# Patient Record
Sex: FEMALE | Race: WHITE
[De-identification: names, ages, dates, MRNs, and addresses within clinical notes are randomized per-mention and may not be internally consistent; named-entity substitution may affect disease eponyms.]

---

## 2017-03-26 NOTE — NUR
Pt given script for flexiril po and motrin po , pt refused and asked for norco 
10/325 mg po . pt informed the er dr would not be writting for that medicine. 
Pt ambulatory w/ steady gait to waiting room , where pt seen bending over 
picking up stuff from the floor w/ no difficulty.

## 2017-03-27 ENCOUNTER — HOSPITAL ENCOUNTER (EMERGENCY)
Dept: HOSPITAL 10 - FTE | Age: 37
Discharge: HOME | End: 2017-03-27
Payer: MEDICARE

## 2017-03-27 VITALS
HEART RATE: 84 BPM | SYSTOLIC BLOOD PRESSURE: 121 MMHG | TEMPERATURE: 98.2 F | RESPIRATION RATE: 18 BRPM | DIASTOLIC BLOOD PRESSURE: 88 MMHG

## 2017-03-27 VITALS — WEIGHT: 152.12 LBS | HEIGHT: 55 IN | BODY MASS INDEX: 35.2 KG/M2

## 2017-03-27 DIAGNOSIS — M54.5: Primary | ICD-10-CM

## 2017-03-27 PROCEDURE — 72100 X-RAY EXAM L-S SPINE 2/3 VWS: CPT

## 2017-03-27 NOTE — RADRPT
PROCEDURE:   Lumbar spine series 

 

CLINICAL INDICATION:   Back pain

 

TECHNIQUE:   Three views of the lumbar spine are available for review 

 

COMPARISON:   None available 

 

FINDINGS:

 

The normal lumbar lordosis is preserved.  There is subtle levoscoliosis of the lumbar spine. Alignme
nt is intact. No acute fracture or dislocation is seen. Vertebral body heights are well maintained. 
 Intervertebral disk heights are well maintained.  Multiple clips are noted in the left upper quadra
nt.  There is a small rounded appearing catheter likely related to prior lap band..  

 

IMPRESSION:

 

1.  Subtle levoscoliosis of the lumbar spine.

2.  Otherwise unremarkable lumbar spine series. 

 

RPTAT: KK

_____________________________________________ 

.Cruz Higginbotham MD, MD           Date    Time 

Electronically viewed and signed by .Cruz Higginbotham MD, MD on 03/27/2017 12:29 

 

D:  03/27/2017 12:29  T:  03/27/2017 12:29

.B/

## 2017-03-27 NOTE — ERD
ER Documentation


Chief Complaint


Date/Time


DATE: 3/27/17 


TIME: 10:55


Chief Complaint


non traumatic low backpain for 3 days. seen for same in other er's today





HPI


36-year-old female complaining of right lower back pain 3 days.  Patient 

stated the pain onset 3 days ago while she woke up.  She was at [facility] on 5150 hold at that time.  She was discharged yesterday.  Since then 

she has been to 4 other EDs for her back pain.  This is her third ED visit 

today.  Patient stated that the pain initiated at her right buttock, and 

radiates down her right foot.  She had received Norco, Flexeril, tramadol, 

morphine injections, Toradol injections, steroid injections from her previous 

ED visits in the last 2 days.  Morphine did help her pain, but only briefly.  

No other treatment helped.  Denies injury.  Denies fever or chills.  Denies 

saddle paresthesia.





ROS


All systems reviewed and are negative except as per history of present illness.





Medications


Home Meds


Active Scripts


Ibuprofen* (Motrin*) 600 Mg Tab, 600 MG PO Q6H Y for PAIN AND OR ELEVATED TEMP, 

#30 TAB


   Prov:VAISHALI GAMA NP         3/27/17


Reported Medications


Hydromorphone Hcl (Dilaudid) 2 Mg Tab


   8/17/10


Topiramate* (Topamax*) 100 Mg Tablet


   8/17/10


Duloxetine Hcl* (Cymbalta*) 60 Mg Capsule.


   8/17/10


[None]   No Conflict Check


   5/12/10





Allergies


Allergies:  


Coded Allergies:  


     No Known Allergies (Verified  Allergy, Mild, 8/18/10)





PMhx/Soc


History of Surgery:  Yes (gastric bypass)


Anesthesia Reaction:  No


Hx Neurological Disorder:  No


Hx Respiratory Disorders:  No


Hx Cardiac Disorders:  Yes (cardiac myopathy)


Hx Psychiatric Problems:  No


Hx Miscellaneous Medical Probl:  No


Hx Alcohol Use:  No


Hx Substance Use:  No


Hx Tobacco Use:  No





Physical Exam


Vitals





Vital Signs








  Date Time  Temp Pulse Resp B/P Pulse Ox O2 Delivery O2 Flow Rate FiO2


 


3/27/17 09:31 98.5 100 21 141/81 98   








Physical Exam


General impression:  Well-developed, well-nourished.  Alert, oriented, in no 

acute distress


Head:    Normocephalic, atraumatic.


Neck:    Supple, nontender. No lymphadenopathy. No nuchal rigidity.


Respiration:    Normal respiratory effort. Lungs clear to auscultate 

bilaterally. No wheezes, rales or rhonchi.


Cardiovascular: Regular rate and rhythm. No murmurs or extra heart sounds.


Abdomen:    Abdomen normal to inspection. Nontender. No masses or organomegaly. 

Bowel sounds normal.


Back:   Normal to inspection. Midline spine tenderness at L5 and S1.  Right 

buttock tenderness.  No CVA tenderness.  Questionable straight leg raise on the 

right.


Extremities:    Extremities normal to inspection, nontender. ROM normal.  

Neurovascularly intact.  No saddle paresthesia.


Neuro:    Mental status normal, speech normal. CNS grossly intact. 


Skin:    Normal turgor. No rash or lesions.


Psych:    Patient appears to be extremely anxious.


Results 24 hrs





 Current Medications








 Medications


  (Trade)  Dose


 Ordered  Sig/Ethan


 Route


 PRN Reason  Start Time


 Stop Time Status Last Admin


Dose Admin


 


 Bupivacaine HCl


  (Marcaine 0.25%


  (Mpf) 10 ml)  10 ml  ONCE  ONCE


 INJ


   3/27/17 11:00


 3/27/17 11:01 DC  


 


 


 Lorazepam


  (Ativan)  1 mg  ONCE  ONCE


 PO


   3/27/17 13:00


 3/27/17 13:01 DC 3/27/17 12:57


 





PROCEDURE:   Lumbar spine series 


 


CLINICAL INDICATION:   Back pain


 


TECHNIQUE:   Three views of the lumbar spine are available for review 


 


COMPARISON:   None available 


 


FINDINGS:


 


The normal lumbar lordosis is preserved.  There is subtle levoscoliosis of the 

lumbar spine. Alignment is intact. No acute fracture or dislocation is seen. 

Vertebral body heights are well maintained.  Intervertebral disk heights are 

well maintained.  Multiple clips are noted in the left upper quadrant.  There 

is a small rounded appearing catheter likely related to prior lap band..  


 


IMPRESSION:


 


1.  Subtle levoscoliosis of the lumbar spine.


2.  Otherwise unremarkable lumbar spine series. 


 


RPTAT: KK


_____________________________________________ 


.Cruz Higginbotham MD, MD           Date    Time 


Electronically viewed and signed by .Cruz Higginbotham MD, MD on 03/27/ 2017 12:29 


 


D:  03/27/2017 12:29  T:  03/27/2017 12:29


.B/





CC: VAISHALI GAMA NP





Procedures/MDM


36-year-old female presented ED was right buttock pain that radiates down her 

leg.  Patient is noted to have midline tenderness in the L5-S1 levels.  X-ray 

of lumbar spine was obtained.  X-ray is negative for spinal fracture or 

subluxation.  I have low suspicion for spinal epidural abscess, or cauda equina 

syndrome.  This is patient's fifth visit to ED in the last 2 days, third visit 

today.  All 5 visits are to different EDs. Patient is observed to be resting 

comfortably.  However when she is talking to a provider or a nurse, she cries 

and states the pain is unbearable.  She requests narcotic prescription.  Review 

of cures database shows that patient receives Suboxone monthly, 90 tablets in 

February.  I told patient that I would not be prescribe her with any narcotics, 

I will give her a trigger point injection to help ease her pain.





Procedure note: Trigger point injection


Trigger point injection performed by me. 10 mL of bupivacaine is injected into 

right gluteal region. Total number muscle groups injected: 1. 





Patient also states that she was released from 5150 hold at [facility] 2 days ago, she is currently homeless.  She feels extremely anxious, 

wants Ativan.  Cures database also show the patient receives Ativan 

prescriptions monthly, last time the prescription filled was on 3/14/2017.  I 

told her that I will give her 1 tablet of Ativan here in the ED, but will not 

give her any prescriptions.  She refused  consult.  There is also 

no record of her admitted at [facility] within this month based 

on the [facility] data.





Patient's behavior is highly suspicious for drug-seeking.  Patient appears well

, stable for discharge and outpatient management.  Patient is advised to follow-

up with her PCP and her pain management provider.  Medical decision making 

shared with patient and family. Education provided to patient and family. 

Patient and family expressed understanding of the plan.





Medications on discharge: Ibuprofen.


Follow-up: Primary care provider or pain management.











VAISHALI GAMA NP Mar 27, 2017 11:06

## 2017-04-10 NOTE — NUR
PT LEFT ROOM, AMBULATING TO THE , TO THE NURSING STATION, YELLING AND 
SCREAMING TO STAFF, HAVING LOUD OUT BURST, CONSTANT RE-DIRECTION NEEDED. 



CHARGE NURSE IS AWARE. MD LAWRENCE IS AWARE.  [LEFT MESSAGE], 
SECURITY IS AWARE



PT HAVE SCATTERED THOUGHT PROCESS, HIGHLY NEEDY, HIGHLY DEMANDING



MD LAWRENCE AT BEDSIDE CONSTANTLY RE-DIRECTING PT ALONG WITH NURSES HOWEVER, PT 
REMAINS NON-COMPLAINT AT THIS TIME. 



WCTM PT AT THIS TIME.

## 2017-04-10 NOTE — NUR
PT IS NOW IN BED, REMAINS RESTLESS, LESS ANXIOUS, NO LONGER SCREAMING AT THIS 
TIME. CONSTANT RE-DIRECTION/EMOTIONAL SUPPORT NEEDED. WCTM PT AT THIS TIME. 
WAITING FOR FURTHER PLAN OF CARE

## 2017-04-10 NOTE — NUR
KATHLEEN called by SAM Beltrán to meet with patient.  KATHLEEN arrived to the ED and consulted with SAM Beltrán and Dr. Rebolledo.  10:20am, SW met with patient, who was sitting on the edge of the bed 
and had her friend Swapnil in the room with her.  Patient was receptive to meeting with SW.  
SW asked patient if she was fine with having Swapnil in the room during the meeting, and 
patient stated "yes he can stay".  SW completed a psychosocial assessment.  Patient is a 35 y/o female; she reported becoming recently homeless after not being able to afford her 
previous place of residence.  Patient used to work in customer service, but became 
unemployed about 1 year ago.  Patient stated that she had some money to live off of for a 
while, but recently became unable to afford her place of residence.  Patient stated that she 
and her friend Swapnil were living in a homeless camp, but were now looking for alternative 
housing.  At the time of this interview with the SW, patient was alert, oriented, maintained 
appropriate eye contact, and was cooperative.  Patient's tone of voice and speech were 
within normal limits and pattern.  Patient with history of mental illness, reporting 
suffering from depression and anxiety from age 13. Patient reported being on Paxil, and 
being followed by a psychiatrist.  Patient reported not receiving counseling services and 
was open to resources for counseling. Patient also reported history of drug use, mostly 
marijuana as recently as yesterday.  Patient stated that last night and this morning, she 
felt very anxious and overwhelmed and was having a hard time breathing, which resulted in 
her being brought into the ED by paramedics (see MD notes for additional details).  Patient 
assessed for SI, which she reported she had had one attempt of an overdose a couple of years 
ago.  No current SI/HI.  Patient reported her only income is SSDI.  SW explored patient's 
support system, and patient reported that she does not have any family; patient reported 
that her closest support system is her friend Swapnil.  SW explored the need for community 
resources, and the patient agreed to the following resources:

1) [employer]:  a list of food pantries throughout the [facility] provided to the 
patient

2) A list of locations and daily schedule for showers and hot meals in the [facility] provided to patient

3) [facility] [place] [address] [place] [phone]

4) A list of cheap hotels in [place] provided to patient

5) Outpatient mental health agencies:  [facility] 
[phone] and [facility] [phone]

6) [address] [place] [address] [facility]



Patient expressed being content with these resources and thanked the SW.  Patient also 
stated that she and Swapnil were looking for apartments that they can afford between her 
SSDI and his paycheck, and had already connected with one landlord to go look at a vacant 
apartment. She reported not needing additional resources for housing then the ones that SW 
provided.  KATHLEEN informed SAM Beltrán and Dr. Rebolledo that resources were provided.

Copies of these resources were placed in the patient's ED file.

## 2017-04-10 NOTE — NUR
PT AT BEDSIDE, SPEAKING WITH "NORMAL" TONE/PACE, WITH VISITOR SOPHIA, NO 
LABOURED BREATHING, SPEAKING IN FULL SENTENCES. NO SOB NOTED AT THIS TIME. PT 
IS IN NO DISTRESS, POSTURE SUDDENLY CHANGED. PT IS SITTING UPRIGHT, NO TEARS, 
NO LOUD OUTBURST, NOT ANXIOUS, NOT RESTLESS, PT IS CALM AND COLLECTED.

## 2017-04-10 NOTE — NUR
PT IS AT BEDSIDE, HIGHLY ANXIOUS, RESTLESS, LOUD OUTBURST, AGITATED, HIGHLY 
NEEDY. PER REPORT FROM RA, PT TOOK "MARIJUANA AND OTHER DRUG SUBSTANCE" CAUSING 
PT TO BE HIGHLY PARANOID, FEARFUL, TEARFUL. PT NEEDS CONSTANT RE-DIRECTION 
NEEDED. NON-COMPLIANT WITH NURSING STAFF. SCREAMING AND YELLING IN THE HALLWAY. 
DENIES SI/HI/AVH AT THIS TIME. MD LAWRENCE IS AWARE. MEDICATION ADMINISTERED AS 
ORDERED. WCTM PT AT THIS TIME. WAITING FOR FURTHER PLAN OF CARE

## 2017-04-10 NOTE — NUR
Patient discharged to home in stable conditon.  Written and verbal after care 
instructions given. 

Patient verbalizes understanding of instructions. No further questions or 
concerns noted prior on leaving the ED. Available resources given to pt per 
Social Work instructions.

## 2017-04-10 NOTE — NUR
PT IS EATING WITH GOOD APPETITE. NAD NOTED. BREATHING IS EVEN AND UNLABORED, NO 
LOUD OUTBURST, NO COMPLAINTS, AMBULATORY WITH STEADY GAIT. 





AT THIS TIME, PT APPEARS TO HAVE "MED SEEKING BEHAVIOR", WILL CRY OUT LOUD WHEN 
NEEDS ARE NOT MET, THROWS SUDDEN TANTRUMS UNTIL NEEDS ARE MET. CONSTANT 
RE-DIRECTION NEEDED. HIGHLY ATTENTION SEEKING. 



WCTM PT AT THIS TIME.

## 2017-04-10 NOTE — NUR
ORDERED BREAKFAST TRAY FOR PT FOR COMFORT. PT HAVE A LONG HX OF SUBSTANCE 
ABUSE. AFTER MEDICATION WAS ADMINISTERED, PT BECAME CALM FOR A SHORT PERIOD OF 
TIME. THEN PT RETURNED TO ORIGINAL STATE OF HAVING LOUD OUTBURST, UNCOOPERATIVE 
BEHAVIOR, CONSTANT RE-DRECTION. PT IS REFUSING LAB DRAWS, EXPLAINED IMPORTANCE 
OF LAB DRAWS, PT REFUSED. MD LAWRENCE MADE AWARE. WCTM PT AT THIS TIME. WAITING 
FOR FURTHER PLAN OF CARE

## 2017-07-07 ENCOUNTER — HOSPITAL ENCOUNTER (EMERGENCY)
Dept: HOSPITAL 72 - EMR | Age: 37
Discharge: HOME | End: 2017-07-07
Payer: COMMERCIAL

## 2017-07-07 VITALS — SYSTOLIC BLOOD PRESSURE: 113 MMHG | DIASTOLIC BLOOD PRESSURE: 78 MMHG

## 2017-07-07 VITALS — WEIGHT: 140 LBS | HEIGHT: 64 IN | BODY MASS INDEX: 23.9 KG/M2

## 2017-07-07 DIAGNOSIS — F41.9: Primary | ICD-10-CM

## 2017-07-07 PROCEDURE — 99283 EMERGENCY DEPT VISIT LOW MDM: CPT

## 2017-07-09 ENCOUNTER — HOSPITAL ENCOUNTER (EMERGENCY)
Dept: HOSPITAL 72 - EMR | Age: 37
Discharge: HOME | End: 2017-07-09
Payer: COMMERCIAL

## 2017-07-09 VITALS — WEIGHT: 140 LBS | BODY MASS INDEX: 23.9 KG/M2 | HEIGHT: 64 IN

## 2017-07-09 VITALS — DIASTOLIC BLOOD PRESSURE: 71 MMHG | SYSTOLIC BLOOD PRESSURE: 128 MMHG

## 2017-07-09 VITALS — SYSTOLIC BLOOD PRESSURE: 128 MMHG | DIASTOLIC BLOOD PRESSURE: 71 MMHG

## 2017-07-09 DIAGNOSIS — F41.9: Primary | ICD-10-CM

## 2017-07-09 LAB
ALBUMIN/GLOB SERPL: 1.4 {RATIO} (ref 1–2.7)
ALT SERPL-CCNC: 12 U/L (ref 3–33)
ANION GAP SERPL CALC-SCNC: 18 MMOL/L (ref 5–15)
APAP SERPL-MCNC: < 10 UG/ML (ref 10–30)
AST SERPL-CCNC: 25 U/L (ref 5–40)
BASOPHILS NFR BLD AUTO: 1.2 % (ref 0–2)
CALCIUM SERPL-MCNC: 9.2 MG/DL (ref 8.6–10.2)
CHLORIDE SERPL-SCNC: 103 MEQ/L (ref 98–107)
CO2 SERPL-SCNC: 17 MEQ/L (ref 20–30)
CREAT SERPL-MCNC: 0.8 MG/DL (ref 0.5–0.9)
EOSINOPHIL NFR BLD AUTO: 0.9 % (ref 0–3)
ERYTHROCYTE [DISTWIDTH] IN BLOOD BY AUTOMATED COUNT: 16.9 % (ref 11.6–14.8)
ETHANOL SERPL-MCNC: < 10 MG/DL
GFR SERPLBLD BASED ON 1.73 SQ M-ARVRAT: > 60 ML/MIN (ref 60–?)
GLOBULIN SER-MCNC: 3 G/DL
HEMOLYSIS: 69
LYMPHOCYTES NFR BLD AUTO: 23 % (ref 20–45)
MCH RBC QN AUTO: 24 PG (ref 27–31)
MCHC RBC AUTO-ENTMCNC: 32.2 G/DL (ref 32–36)
MCV RBC AUTO: 75 FL (ref 80–99)
MONOCYTES NFR BLD AUTO: 5.1 % (ref 1–10)
NEUTROPHILS NFR BLD AUTO: 69.8 % (ref 45–75)
PLATELET # BLD: 369 K/UL (ref 150–450)
PMV BLD AUTO: 8.3 FL (ref 6.5–10.1)
POTASSIUM SERPL-SCNC: 4.8 MEQ/L (ref 3.4–4.9)
PROT SERPL-MCNC: 7.3 G/DL (ref 6.6–8.7)
RBC # BLD AUTO: 4.45 M/UL (ref 4.2–5.4)
SODIUM SERPL-SCNC: 138 MEQ/L (ref 135–145)
WBC # BLD AUTO: 6.8 K/UL (ref 4.8–10.8)

## 2017-07-09 PROCEDURE — 80300: CPT

## 2017-07-09 PROCEDURE — 85025 COMPLETE CBC W/AUTO DIFF WBC: CPT

## 2017-07-09 PROCEDURE — 99283 EMERGENCY DEPT VISIT LOW MDM: CPT

## 2017-07-09 PROCEDURE — 80053 COMPREHEN METABOLIC PANEL: CPT

## 2017-07-09 PROCEDURE — 36415 COLL VENOUS BLD VENIPUNCTURE: CPT

## 2017-07-09 PROCEDURE — 80329 ANALGESICS NON-OPIOID 1 OR 2: CPT

## 2017-07-09 PROCEDURE — 96372 THER/PROPH/DIAG INJ SC/IM: CPT

## 2017-07-21 ENCOUNTER — HOSPITAL ENCOUNTER (EMERGENCY)
Dept: HOSPITAL 72 - EMR | Age: 37
Discharge: HOME | End: 2017-07-21
Payer: COMMERCIAL

## 2017-07-21 VITALS — WEIGHT: 140 LBS | BODY MASS INDEX: 23.9 KG/M2 | HEIGHT: 64 IN

## 2017-07-21 VITALS — SYSTOLIC BLOOD PRESSURE: 121 MMHG | DIASTOLIC BLOOD PRESSURE: 72 MMHG

## 2017-07-21 VITALS — DIASTOLIC BLOOD PRESSURE: 72 MMHG | SYSTOLIC BLOOD PRESSURE: 121 MMHG

## 2017-07-21 DIAGNOSIS — F41.9: Primary | ICD-10-CM

## 2017-07-21 PROCEDURE — 99282 EMERGENCY DEPT VISIT SF MDM: CPT

## 2017-07-27 ENCOUNTER — HOSPITAL ENCOUNTER (EMERGENCY)
Dept: HOSPITAL 72 - EMR | Age: 37
Discharge: HOME | End: 2017-07-27
Payer: COMMERCIAL

## 2017-07-27 VITALS — WEIGHT: 150 LBS | HEIGHT: 64 IN | BODY MASS INDEX: 25.61 KG/M2

## 2017-07-27 VITALS — DIASTOLIC BLOOD PRESSURE: 74 MMHG | SYSTOLIC BLOOD PRESSURE: 113 MMHG

## 2017-07-27 VITALS — SYSTOLIC BLOOD PRESSURE: 113 MMHG | DIASTOLIC BLOOD PRESSURE: 74 MMHG

## 2017-07-27 DIAGNOSIS — F11.20: ICD-10-CM

## 2017-07-27 DIAGNOSIS — F41.9: Primary | ICD-10-CM

## 2017-07-27 DIAGNOSIS — F13.20: ICD-10-CM

## 2017-07-27 DIAGNOSIS — Z76.5: ICD-10-CM

## 2017-07-27 PROCEDURE — 99282 EMERGENCY DEPT VISIT SF MDM: CPT

## 2017-10-28 ENCOUNTER — HOSPITAL ENCOUNTER (EMERGENCY)
Dept: HOSPITAL 72 - EMR | Age: 37
Discharge: HOME | End: 2017-10-28
Payer: MEDICARE

## 2017-10-28 VITALS — DIASTOLIC BLOOD PRESSURE: 87 MMHG | SYSTOLIC BLOOD PRESSURE: 137 MMHG

## 2017-10-28 VITALS — HEIGHT: 64 IN | WEIGHT: 140 LBS | BODY MASS INDEX: 23.9 KG/M2

## 2017-10-28 VITALS — DIASTOLIC BLOOD PRESSURE: 64 MMHG | SYSTOLIC BLOOD PRESSURE: 110 MMHG

## 2017-10-28 VITALS — DIASTOLIC BLOOD PRESSURE: 98 MMHG | SYSTOLIC BLOOD PRESSURE: 117 MMHG

## 2017-10-28 DIAGNOSIS — F41.0: ICD-10-CM

## 2017-10-28 DIAGNOSIS — Z90.49: ICD-10-CM

## 2017-10-28 DIAGNOSIS — F41.9: Primary | ICD-10-CM

## 2017-10-28 DIAGNOSIS — I42.9: ICD-10-CM

## 2017-10-28 DIAGNOSIS — Z98.84: ICD-10-CM

## 2017-10-28 DIAGNOSIS — N39.0: ICD-10-CM

## 2017-10-28 LAB
ALBUMIN/GLOB SERPL: 1 {RATIO} (ref 1–2.7)
ALT SERPL-CCNC: 63 U/L (ref 12–78)
ANION GAP SERPL CALC-SCNC: 9 MMOL/L (ref 5–15)
APPEARANCE UR: (no result)
AST SERPL-CCNC: 28 U/L (ref 15–37)
BACTERIA #/AREA URNS HPF: (no result) /HPF
BASOPHILS NFR BLD AUTO: 1.5 % (ref 0–2)
CALCIUM SERPL-MCNC: 8.9 MG/DL (ref 8.5–10.1)
CHLORIDE SERPL-SCNC: 102 MMOL/L (ref 98–107)
CO2 SERPL-SCNC: 27 MMOL/L (ref 21–32)
CREAT SERPL-MCNC: 0.7 MG/DL (ref 0.55–1.3)
EOSINOPHIL NFR BLD AUTO: 3.5 % (ref 0–3)
ERYTHROCYTE [DISTWIDTH] IN BLOOD BY AUTOMATED COUNT: 15.5 % (ref 11.6–14.8)
GFR SERPLBLD BASED ON 1.73 SQ M-ARVRAT: > 60 ML/MIN (ref 60–?)
GLOBULIN SER-MCNC: 3.5 G/DL
ICTOTEST: NEGATIVE
KETONES UR QL STRIP: NEGATIVE
LEUKOCYTE ESTERASE UR QL STRIP: NEGATIVE
LIPASE SERPL-CCNC: 178 U/L (ref 73–393)
LYMPHOCYTES NFR BLD AUTO: 24.3 % (ref 20–45)
MCH RBC QN AUTO: 21.9 PG (ref 27–31)
MCHC RBC AUTO-ENTMCNC: 30.3 G/DL (ref 32–36)
MCV RBC AUTO: 72 FL (ref 80–99)
MONOCYTES NFR BLD AUTO: 7.8 % (ref 1–10)
NEUTROPHILS NFR BLD AUTO: 63 % (ref 45–75)
NITRITE UR QL STRIP: POSITIVE
PH UR STRIP: 5 [PH] (ref 4.5–8)
PLATELET # BLD: 275 K/UL (ref 150–450)
PMV BLD AUTO: 9.5 FL (ref 6.5–10.1)
POTASSIUM SERPL-SCNC: 4.2 MMOL/L (ref 3.5–5.1)
PROT SERPL-MCNC: 7.1 G/DL (ref 6.4–8.2)
PROT UR QL STRIP: (no result)
RBC # BLD AUTO: 4.17 M/UL (ref 4.2–5.4)
RBC #/AREA URNS HPF: (no result) /HPF (ref 0–2)
SODIUM SERPL-SCNC: 138 MMOL/L (ref 136–145)
SP GR UR STRIP: 1.01 (ref 1–1.03)
SQUAMOUS #/AREA URNS LPF: (no result) /LPF
UROBILINOGEN UR-MCNC: 4 MG/DL (ref 0–1)
WBC # BLD AUTO: 4.4 K/UL (ref 4.8–10.8)
WBC #/AREA URNS HPF: (no result) /HPF (ref 0–2)

## 2017-10-28 PROCEDURE — 36415 COLL VENOUS BLD VENIPUNCTURE: CPT

## 2017-10-28 PROCEDURE — 81003 URINALYSIS AUTO W/O SCOPE: CPT

## 2017-10-28 PROCEDURE — 83690 ASSAY OF LIPASE: CPT

## 2017-10-28 PROCEDURE — 80053 COMPREHEN METABOLIC PANEL: CPT

## 2017-10-28 PROCEDURE — 96361 HYDRATE IV INFUSION ADD-ON: CPT

## 2017-10-28 PROCEDURE — 87086 URINE CULTURE/COLONY COUNT: CPT

## 2017-10-28 PROCEDURE — 99284 EMERGENCY DEPT VISIT MOD MDM: CPT

## 2017-10-28 PROCEDURE — 96374 THER/PROPH/DIAG INJ IV PUSH: CPT

## 2017-10-28 PROCEDURE — 85025 COMPLETE CBC W/AUTO DIFF WBC: CPT

## 2017-10-28 PROCEDURE — 71010: CPT

## 2018-07-25 ENCOUNTER — HOSPITAL ENCOUNTER (EMERGENCY)
Dept: HOSPITAL 91 - E/R | Age: 38
Discharge: HOME | End: 2018-07-25
Payer: COMMERCIAL

## 2018-07-25 DIAGNOSIS — R11.2: ICD-10-CM

## 2018-07-25 DIAGNOSIS — Z76.5: ICD-10-CM

## 2018-07-25 DIAGNOSIS — F17.210: ICD-10-CM

## 2018-07-25 DIAGNOSIS — Z87.891: ICD-10-CM

## 2018-07-25 DIAGNOSIS — R10.84: Primary | ICD-10-CM

## 2018-07-25 DIAGNOSIS — R10.11: Primary | ICD-10-CM

## 2018-07-25 LAB
ADD MAN DIFF?: NO
ADD UMIC: NO
ALANINE AMINOTRANSFERASE: 13 IU/L (ref 13–69)
ALBUMIN/GLOBULIN RATIO: 1.32
ALBUMIN: 3.7 G/DL (ref 3.3–4.9)
ALKALINE PHOSPHATASE: 60 IU/L (ref 42–121)
ANION GAP: 12 (ref 8–16)
ASPARTATE AMINO TRANSFERASE: 32 IU/L (ref 15–46)
BASOPHIL #: 0 10^3/UL (ref 0–0.1)
BASOPHILS %: 0.6 % (ref 0–2)
BILIRUBIN,DIRECT: 0 MG/DL (ref 0–0.2)
BILIRUBIN,TOTAL: 0.2 MG/DL (ref 0.2–1.3)
BLOOD UREA NITROGEN: 14 MG/DL (ref 7–20)
CALCIUM: 8.8 MG/DL (ref 8.4–10.2)
CARBON DIOXIDE: 19 MMOL/L (ref 21–31)
CHLORIDE: 115 MMOL/L (ref 97–110)
CREATININE: 0.79 MG/DL (ref 0.44–1)
EOSINOPHILS #: 0.1 10^3/UL (ref 0–0.5)
EOSINOPHILS %: 1.4 % (ref 0–7)
GLOBULIN: 2.8 G/DL (ref 1.3–3.2)
GLUCOSE: 89 MG/DL (ref 70–220)
HEMATOCRIT: 32.7 % (ref 37–47)
HEMOGLOBIN: 9.6 G/DL (ref 12–16)
LIPASE: 150 U/L (ref 23–300)
LYMPHOCYTES #: 1.6 10^3/UL (ref 0.8–2.9)
LYMPHOCYTES %: 31.3 % (ref 15–51)
MEAN CORPUSCULAR HEMOGLOBIN: 24.1 PG (ref 29–33)
MEAN CORPUSCULAR HGB CONC: 29.4 G/DL (ref 32–37)
MEAN CORPUSCULAR VOLUME: 82 FL (ref 82–101)
MEAN PLATELET VOLUME: 12.2 FL (ref 7.4–10.4)
MONOCYTE #: 0.4 10^3/UL (ref 0.3–0.9)
MONOCYTES %: 8.7 % (ref 0–11)
NEUTROPHIL #: 2.9 10^3/UL (ref 1.6–7.5)
NEUTROPHILS %: 57.6 % (ref 39–77)
NUCLEATED RED BLOOD CELLS #: 0 10^3/UL (ref 0–0)
NUCLEATED RED BLOOD CELLS%: 0 /100WBC (ref 0–0)
PLATELET COUNT: 201 10^3/UL (ref 140–415)
POSITIVE DIFF: (no result)
POTASSIUM: 3.6 MMOL/L (ref 3.5–5.1)
RED BLOOD COUNT: 3.99 10^6/UL (ref 4.2–5.4)
RED CELL DISTRIBUTION WIDTH: 17.2 % (ref 11.5–14.5)
SODIUM: 142 MMOL/L (ref 135–144)
TOTAL PROTEIN: 6.5 G/DL (ref 6.1–8.1)
UR ASCORBIC ACID: NEGATIVE MG/DL
UR BACTERIA: (no result) /HPF
UR BILIRUBIN (DIP): NEGATIVE MG/DL
UR BLOOD (DIP): NEGATIVE MG/DL
UR CLARITY: (no result)
UR COLOR: YELLOW
UR GLUCOSE (DIP): NEGATIVE MG/DL
UR KETONES (DIP): NEGATIVE MG/DL
UR LEUKOCYTE ESTERASE (DIP): NEGATIVE LEU/UL
UR MUCUS: (no result) /HPF
UR NITRITE (DIP): NEGATIVE MG/DL
UR PH (DIP): 5 (ref 5–9)
UR RBC: 0 /HPF (ref 0–5)
UR SPECIFIC GRAVITY (DIP): 1.02 (ref 1–1.03)
UR SQUAMOUS EPITHELIAL CELL: (no result) /HPF
UR TOTAL PROTEIN (DIP): NEGATIVE MG/DL
UR UROBILINOGEN (DIP): (no result) MG/DL
UR WBC: 3 /HPF (ref 0–5)
URINE PH (DIP) POC: 5.5 (ref 5–8.5)
WHITE BLOOD COUNT: 5 10^3/UL (ref 4.8–10.8)

## 2018-07-25 PROCEDURE — 81025 URINE PREGNANCY TEST: CPT

## 2018-07-25 PROCEDURE — 80053 COMPREHEN METABOLIC PANEL: CPT

## 2018-07-25 PROCEDURE — 83690 ASSAY OF LIPASE: CPT

## 2018-07-25 PROCEDURE — 81003 URINALYSIS AUTO W/O SCOPE: CPT

## 2018-07-25 PROCEDURE — 81001 URINALYSIS AUTO W/SCOPE: CPT

## 2018-07-25 PROCEDURE — 99283 EMERGENCY DEPT VISIT LOW MDM: CPT

## 2018-07-25 PROCEDURE — 99284 EMERGENCY DEPT VISIT MOD MDM: CPT

## 2018-07-25 PROCEDURE — 74176 CT ABD & PELVIS W/O CONTRAST: CPT

## 2018-07-25 PROCEDURE — 87086 URINE CULTURE/COLONY COUNT: CPT

## 2018-07-25 PROCEDURE — 36415 COLL VENOUS BLD VENIPUNCTURE: CPT

## 2018-07-25 PROCEDURE — 85025 COMPLETE CBC W/AUTO DIFF WBC: CPT

## 2018-07-25 RX ADMIN — ONDANSETRON 1 MG: 4 TABLET, ORALLY DISINTEGRATING ORAL at 03:28

## 2018-07-25 RX ADMIN — HYDROCODONE BITARTRATE AND ACETAMINOPHEN 1 TAB: 10; 325 TABLET ORAL at 03:28

## 2018-07-30 ENCOUNTER — HOSPITAL ENCOUNTER (EMERGENCY)
Dept: HOSPITAL 54 - ER | Age: 38
Discharge: LEFT BEFORE DISCHARGE | End: 2018-07-30
Payer: MEDICARE

## 2018-07-30 VITALS — SYSTOLIC BLOOD PRESSURE: 118 MMHG | DIASTOLIC BLOOD PRESSURE: 72 MMHG

## 2018-07-30 VITALS — WEIGHT: 155 LBS | HEIGHT: 64 IN | BODY MASS INDEX: 26.46 KG/M2

## 2018-07-30 DIAGNOSIS — F17.200: ICD-10-CM

## 2018-07-30 DIAGNOSIS — R10.11: Primary | ICD-10-CM
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
## 2018-07-30 DIAGNOSIS — F41.9: ICD-10-CM

## 2018-07-30 DIAGNOSIS — Z98.86: ICD-10-CM

## 2018-07-30 DIAGNOSIS — Z98.0: ICD-10-CM

## 2018-07-30 DIAGNOSIS — Z90.49: ICD-10-CM

## 2018-07-30 DIAGNOSIS — R11.2: ICD-10-CM

## 2018-07-30 DIAGNOSIS — F32.9: ICD-10-CM

## 2018-07-30 DIAGNOSIS — F10.20: ICD-10-CM

## 2018-07-30 LAB
ALBUMIN SERPL BCP-MCNC: 4 G/DL (ref 3.4–5)
ALP SERPL-CCNC: 91 U/L (ref 46–116)
ALT SERPL W P-5'-P-CCNC: 17 U/L (ref 12–78)
AST SERPL W P-5'-P-CCNC: 14 U/L (ref 15–37)
BASOPHILS # BLD AUTO: 0 /CMM (ref 0–0.2)
BASOPHILS NFR BLD AUTO: 0.6 % (ref 0–2)
BILIRUB DIRECT SERPL-MCNC: 0.1 MG/DL (ref 0–0.2)
BILIRUB SERPL-MCNC: 0.3 MG/DL (ref 0.2–1)
BUN SERPL-MCNC: 16 MG/DL (ref 7–18)
CALCIUM SERPL-MCNC: 9 MG/DL (ref 8.5–10.1)
CHLORIDE SERPL-SCNC: 109 MMOL/L (ref 98–107)
CO2 SERPL-SCNC: 20 MMOL/L (ref 21–32)
CREAT SERPL-MCNC: 1.1 MG/DL (ref 0.6–1.3)
EOSINOPHIL NFR BLD AUTO: 1.4 % (ref 0–6)
GLUCOSE SERPL-MCNC: 106 MG/DL (ref 74–106)
HCT VFR BLD AUTO: 33 % (ref 33–45)
HGB BLD-MCNC: 10.5 G/DL (ref 11.5–14.8)
LIPASE SERPL-CCNC: 238 U/L (ref 73–393)
LYMPHOCYTES NFR BLD AUTO: 0.8 /CMM (ref 0.8–4.8)
LYMPHOCYTES NFR BLD AUTO: 14.3 % (ref 20–44)
MCH RBC QN AUTO: 24 PG (ref 26–33)
MCHC RBC AUTO-ENTMCNC: 32 G/DL (ref 31–36)
MCV RBC AUTO: 75 FL (ref 82–100)
MONOCYTES NFR BLD AUTO: 0.4 /CMM (ref 0.1–1.3)
MONOCYTES NFR BLD AUTO: 7.4 % (ref 2–12)
NEUTROPHILS # BLD AUTO: 4.2 /CMM (ref 1.8–8.9)
NEUTROPHILS NFR BLD AUTO: 76.3 % (ref 43–81)
PH UR STRIP: 5 [PH] (ref 5–8)
PLATELET # BLD AUTO: 408 /CMM (ref 150–450)
POTASSIUM SERPL-SCNC: 3.9 MMOL/L (ref 3.5–5.1)
PROT SERPL-MCNC: 7.6 G/DL (ref 6.4–8.2)
RBC # BLD AUTO: 4.37 MIL/UL (ref 4–5.2)
RDW COEFFICIENT OF VARIATION: 16.6 (ref 11.5–15)
SODIUM SERPL-SCNC: 140 MMOL/L (ref 136–145)
UROBILINOGEN UR STRIP-MCNC: 0.2 EU/DL
WBC NRBC COR # BLD AUTO: 5.5 K/UL (ref 4.3–11)

## 2018-07-30 PROCEDURE — Z7610: HCPCS

## 2018-07-30 PROCEDURE — 84703 CHORIONIC GONADOTROPIN ASSAY: CPT

## 2018-07-30 PROCEDURE — 85025 COMPLETE CBC W/AUTO DIFF WBC: CPT

## 2018-07-30 PROCEDURE — 99284 EMERGENCY DEPT VISIT MOD MDM: CPT

## 2018-07-30 PROCEDURE — A4606 OXYGEN PROBE USED W OXIMETER: HCPCS

## 2018-07-30 PROCEDURE — 80048 BASIC METABOLIC PNL TOTAL CA: CPT

## 2018-07-30 PROCEDURE — 81001 URINALYSIS AUTO W/SCOPE: CPT

## 2018-07-30 PROCEDURE — 96361 HYDRATE IV INFUSION ADD-ON: CPT

## 2018-07-30 PROCEDURE — 96375 TX/PRO/DX INJ NEW DRUG ADDON: CPT

## 2018-07-30 PROCEDURE — 36415 COLL VENOUS BLD VENIPUNCTURE: CPT

## 2018-07-30 PROCEDURE — 80076 HEPATIC FUNCTION PANEL: CPT

## 2018-07-30 PROCEDURE — 96374 THER/PROPH/DIAG INJ IV PUSH: CPT

## 2018-07-30 PROCEDURE — 83690 ASSAY OF LIPASE: CPT

## 2018-07-30 NOTE — NUR
Patient does not wish to proceed with medical care recommended by Dr. FRAIRE. Patient given information related to possible complications, up to 
and including death, which could occur as a result of leaving the hospital at 
this time. Patient verbalizes understanding of risks involved due to leaving 
against medical advice. Patient has signed AMA form. PT AMBULATED WITH STABLE 
GAIT UPON DC. IV removed. Catheter intact and site benign. Pressure and 4x4 
applied to site. No bleeding noted. PT INFORMED NOT TO OPERATE HEAVY MACHINERY

## 2018-07-30 NOTE — NUR
PT BIB SELF COMPLAINING OF ABDOMINAL PAIN AND N/V X2 DAYS. PT AA/OX4. PT 
AMBULATED TO HOSP BED WITH STABLE GAIT. SKINS PINK WARM AND DRY. ACTIVE BOWEL 
SOUNDS. NO GAURDING TO STOMACH. NO S/S OF SOB. DENIES CHEST PAIN. VSS. NAD. 
AWAITING MD EVAL/ORDERS

## 2018-08-10 ENCOUNTER — HOSPITAL ENCOUNTER (EMERGENCY)
Dept: HOSPITAL 54 - ER | Age: 38
Discharge: HOME | End: 2018-08-10
Payer: COMMERCIAL

## 2018-08-10 VITALS — DIASTOLIC BLOOD PRESSURE: 86 MMHG | SYSTOLIC BLOOD PRESSURE: 116 MMHG

## 2018-08-10 VITALS — BODY MASS INDEX: 25.61 KG/M2 | WEIGHT: 150 LBS | HEIGHT: 64 IN

## 2018-08-10 DIAGNOSIS — G89.29: ICD-10-CM

## 2018-08-10 DIAGNOSIS — Z98.84: ICD-10-CM

## 2018-08-10 DIAGNOSIS — F41.9: Primary | ICD-10-CM

## 2018-08-10 DIAGNOSIS — F10.10: ICD-10-CM

## 2018-08-10 DIAGNOSIS — F17.200: ICD-10-CM

## 2018-08-10 DIAGNOSIS — F32.9: ICD-10-CM
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
## 2018-08-10 DIAGNOSIS — Z98.86: ICD-10-CM

## 2018-08-10 DIAGNOSIS — Z90.49: ICD-10-CM

## 2018-08-10 PROCEDURE — A4606 OXYGEN PROBE USED W OXIMETER: HCPCS

## 2018-08-10 PROCEDURE — Z7610: HCPCS

## 2018-08-10 PROCEDURE — 93005 ELECTROCARDIOGRAM TRACING: CPT

## 2018-08-10 PROCEDURE — 99283 EMERGENCY DEPT VISIT LOW MDM: CPT

## 2018-08-31 ENCOUNTER — HOSPITAL ENCOUNTER (EMERGENCY)
Age: 38
Discharge: LEFT BEFORE DISCHARGE | End: 2018-08-31

## 2018-08-31 ENCOUNTER — HOSPITAL ENCOUNTER (EMERGENCY)
Dept: HOSPITAL 91 - FTE | Age: 38
Discharge: LEFT BEFORE DISCHARGE | End: 2018-08-31
Payer: COMMERCIAL

## 2018-08-31 DIAGNOSIS — K52.9: Primary | ICD-10-CM

## 2018-08-31 DIAGNOSIS — Z87.891: ICD-10-CM

## 2018-08-31 PROCEDURE — 99282 EMERGENCY DEPT VISIT SF MDM: CPT

## 2018-09-01 NOTE — NUR
Pt yelling, acting inappropriately, demanding she receives medication to calm 
her down.  Pt constantly interrupts staff members and disrupting the entire ER.

## 2018-09-02 ENCOUNTER — HOSPITAL ENCOUNTER (EMERGENCY)
Dept: HOSPITAL 91 - E/R | Age: 38
Discharge: HOME | End: 2018-09-02
Payer: COMMERCIAL

## 2018-09-02 ENCOUNTER — HOSPITAL ENCOUNTER (EMERGENCY)
Age: 38
Discharge: HOME | End: 2018-09-02

## 2018-09-02 DIAGNOSIS — R40.2142: ICD-10-CM

## 2018-09-02 DIAGNOSIS — R40.2362: ICD-10-CM

## 2018-09-02 DIAGNOSIS — R40.2252: ICD-10-CM

## 2018-09-02 DIAGNOSIS — F41.9: Primary | ICD-10-CM

## 2018-09-02 DIAGNOSIS — F17.210: ICD-10-CM

## 2018-09-02 DIAGNOSIS — Z72.89: ICD-10-CM

## 2018-09-02 PROCEDURE — 99283 EMERGENCY DEPT VISIT LOW MDM: CPT

## 2018-09-02 RX ADMIN — ALPRAZOLAM 1 MG: 0.25 TABLET ORAL at 23:32

## 2018-09-10 ENCOUNTER — HOSPITAL ENCOUNTER (EMERGENCY)
Age: 38
LOS: 1 days | Discharge: HOME | End: 2018-09-11

## 2018-09-10 ENCOUNTER — HOSPITAL ENCOUNTER (EMERGENCY)
Dept: HOSPITAL 91 - FTE | Age: 38
Discharge: LEFT BEFORE DISCHARGE | End: 2018-09-10
Payer: SELF-PAY

## 2018-09-10 ENCOUNTER — HOSPITAL ENCOUNTER (EMERGENCY)
Dept: HOSPITAL 91 - FTE | Age: 38
LOS: 1 days | Discharge: HOME | End: 2018-09-11
Payer: COMMERCIAL

## 2018-09-10 DIAGNOSIS — F41.9: Primary | ICD-10-CM

## 2018-09-10 DIAGNOSIS — Z53.21: Primary | ICD-10-CM

## 2018-09-10 DIAGNOSIS — F17.210: ICD-10-CM

## 2018-09-10 DIAGNOSIS — F32.89: ICD-10-CM

## 2018-09-10 PROCEDURE — 99283 EMERGENCY DEPT VISIT LOW MDM: CPT

## 2018-09-11 RX ADMIN — GABAPENTIN 1 MG: 300 CAPSULE ORAL at 02:55

## 2018-09-12 ENCOUNTER — HOSPITAL ENCOUNTER (EMERGENCY)
Dept: HOSPITAL 54 - ER | Age: 38
Discharge: HOME | End: 2018-09-12
Payer: COMMERCIAL

## 2018-09-12 VITALS
BODY MASS INDEX: 26.46 KG/M2 | DIASTOLIC BLOOD PRESSURE: 87 MMHG | WEIGHT: 155 LBS | HEIGHT: 64 IN | SYSTOLIC BLOOD PRESSURE: 136 MMHG

## 2018-09-12 DIAGNOSIS — F32.9: ICD-10-CM

## 2018-09-12 DIAGNOSIS — F17.200: ICD-10-CM

## 2018-09-12 DIAGNOSIS — Z90.49: ICD-10-CM

## 2018-09-12 DIAGNOSIS — Z41.1: ICD-10-CM

## 2018-09-12 DIAGNOSIS — F41.9: Primary | ICD-10-CM
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
## 2018-09-12 DIAGNOSIS — F10.10: ICD-10-CM

## 2018-09-12 PROCEDURE — A4606 OXYGEN PROBE USED W OXIMETER: HCPCS

## 2018-09-12 PROCEDURE — Z7610: HCPCS

## 2018-09-12 PROCEDURE — 99284 EMERGENCY DEPT VISIT MOD MDM: CPT

## 2018-09-15 ENCOUNTER — HOSPITAL ENCOUNTER (EMERGENCY)
Dept: HOSPITAL 54 - ER | Age: 38
Discharge: HOME | End: 2018-09-15
Payer: COMMERCIAL

## 2018-09-15 VITALS — HEIGHT: 64 IN | BODY MASS INDEX: 25.61 KG/M2 | WEIGHT: 150 LBS

## 2018-09-15 VITALS — DIASTOLIC BLOOD PRESSURE: 73 MMHG | SYSTOLIC BLOOD PRESSURE: 116 MMHG

## 2018-09-15 DIAGNOSIS — F32.9: ICD-10-CM

## 2018-09-15 DIAGNOSIS — Z76.5: Primary | ICD-10-CM

## 2018-09-15 DIAGNOSIS — F41.9: ICD-10-CM

## 2018-09-15 DIAGNOSIS — F17.200: ICD-10-CM

## 2018-09-15 DIAGNOSIS — Z90.49: ICD-10-CM

## 2018-09-15 DIAGNOSIS — Z98.86: ICD-10-CM

## 2018-09-15 DIAGNOSIS — Z79.899: ICD-10-CM

## 2018-09-15 DIAGNOSIS — Z98.84: ICD-10-CM

## 2018-09-15 PROCEDURE — 99282 EMERGENCY DEPT VISIT SF MDM: CPT

## 2018-09-15 PROCEDURE — A4606 OXYGEN PROBE USED W OXIMETER: HCPCS

## 2018-09-15 PROCEDURE — Z7610: HCPCS

## 2018-09-15 NOTE — NUR
PT CAME FROM [facility] PANIC ATTACK X 3 HRS AGO AND MULTIPLE COMPLAINTS- 
CHEST DISCOMFORT, ABD PAIN WITH DIARRHEA. SHE'S MOSLTY CONCERNED AS SHE LOST 
HER PRESCRIPTION FOR HER ANXIETY MEDS FROM THE [facility]. COURTNEY. MD AT  FOR 
EVAL. SAFETY AND COMFORT MEASURES PROVIDED. WILL MONITOR.

## 2018-10-07 ENCOUNTER — HOSPITAL ENCOUNTER (EMERGENCY)
Age: 38
Discharge: HOME | End: 2018-10-07

## 2018-10-07 ENCOUNTER — HOSPITAL ENCOUNTER (EMERGENCY)
Dept: HOSPITAL 91 - FTE | Age: 38
Discharge: HOME | End: 2018-10-07
Payer: COMMERCIAL

## 2018-10-07 DIAGNOSIS — F41.9: Primary | ICD-10-CM

## 2018-10-07 DIAGNOSIS — F17.210: ICD-10-CM

## 2018-10-07 PROCEDURE — 99283 EMERGENCY DEPT VISIT LOW MDM: CPT

## 2018-10-07 RX ADMIN — LORAZEPAM 1 MG: 1 TABLET ORAL at 17:09

## 2018-10-21 ENCOUNTER — HOSPITAL ENCOUNTER (EMERGENCY)
Dept: HOSPITAL 91 - FTE | Age: 38
Discharge: HOME | End: 2018-10-21
Payer: COMMERCIAL

## 2018-10-21 ENCOUNTER — HOSPITAL ENCOUNTER (EMERGENCY)
Age: 38
Discharge: HOME | End: 2018-10-21

## 2018-10-21 DIAGNOSIS — F41.9: Primary | ICD-10-CM

## 2018-10-21 DIAGNOSIS — F13.20: ICD-10-CM

## 2018-10-21 DIAGNOSIS — Z87.891: ICD-10-CM

## 2018-10-21 PROCEDURE — 99283 EMERGENCY DEPT VISIT LOW MDM: CPT

## 2018-10-21 RX ADMIN — LORAZEPAM 1 MG: 1 TABLET ORAL at 16:51

## 2018-10-28 NOTE — NUR
DOCTOR CONY IN ROOM AND PATIENT QUICKLY STARTS ACTING OUT AND YELLING THAT 
SHE HAS BEEN HAVING PANIC ATTACK AND HAS NOT SLEPT IN 3 DAYS. SHE IS BEING VERY 
LOUD AND DEMANDING

## 2018-10-29 ENCOUNTER — HOSPITAL ENCOUNTER (EMERGENCY)
Age: 38
Discharge: HOME | End: 2018-10-29

## 2018-10-29 ENCOUNTER — HOSPITAL ENCOUNTER (EMERGENCY)
Dept: HOSPITAL 91 - FTE | Age: 38
Discharge: HOME | End: 2018-10-29
Payer: COMMERCIAL

## 2018-10-29 DIAGNOSIS — Z72.89: ICD-10-CM

## 2018-10-29 DIAGNOSIS — F17.210: ICD-10-CM

## 2018-10-29 DIAGNOSIS — F41.9: Primary | ICD-10-CM

## 2018-10-29 PROCEDURE — 99283 EMERGENCY DEPT VISIT LOW MDM: CPT

## 2018-10-29 RX ADMIN — LORAZEPAM 1 MG: 1 TABLET ORAL at 20:58

## 2018-11-13 ENCOUNTER — HOSPITAL ENCOUNTER (EMERGENCY)
Age: 38
Discharge: HOME | End: 2018-11-13

## 2018-12-10 ENCOUNTER — HOSPITAL ENCOUNTER (EMERGENCY)
Dept: HOSPITAL 91 - FTE | Age: 38
Discharge: HOME | End: 2018-12-10
Payer: COMMERCIAL

## 2018-12-10 ENCOUNTER — HOSPITAL ENCOUNTER (EMERGENCY)
Age: 38
Discharge: HOME | End: 2018-12-10

## 2018-12-10 DIAGNOSIS — F41.9: Primary | ICD-10-CM

## 2018-12-10 DIAGNOSIS — F17.210: ICD-10-CM

## 2018-12-10 PROCEDURE — 99283 EMERGENCY DEPT VISIT LOW MDM: CPT

## 2018-12-10 RX ADMIN — LORAZEPAM 1 MG: 1 TABLET ORAL at 18:02

## 2018-12-13 ENCOUNTER — HOSPITAL ENCOUNTER (EMERGENCY)
Age: 38
Discharge: HOME | End: 2018-12-13

## 2018-12-13 ENCOUNTER — HOSPITAL ENCOUNTER (EMERGENCY)
Dept: HOSPITAL 91 - FTE | Age: 38
Discharge: HOME | End: 2018-12-13
Payer: COMMERCIAL

## 2018-12-13 DIAGNOSIS — F41.9: Primary | ICD-10-CM

## 2018-12-13 DIAGNOSIS — F17.210: ICD-10-CM

## 2018-12-13 PROCEDURE — 99283 EMERGENCY DEPT VISIT LOW MDM: CPT

## 2018-12-13 RX ADMIN — LORAZEPAM 1 MG: 1 TABLET ORAL at 18:54

## 2019-03-06 ENCOUNTER — HOSPITAL ENCOUNTER (EMERGENCY)
Dept: HOSPITAL 72 - EMR | Age: 39
Discharge: HOME | End: 2019-03-06
Payer: MEDICARE

## 2019-03-06 VITALS — DIASTOLIC BLOOD PRESSURE: 54 MMHG | SYSTOLIC BLOOD PRESSURE: 112 MMHG

## 2019-03-06 VITALS — DIASTOLIC BLOOD PRESSURE: 64 MMHG | SYSTOLIC BLOOD PRESSURE: 106 MMHG

## 2019-03-06 VITALS — BODY MASS INDEX: 22.2 KG/M2 | HEIGHT: 64 IN | WEIGHT: 130 LBS

## 2019-03-06 DIAGNOSIS — F11.20: ICD-10-CM

## 2019-03-06 DIAGNOSIS — Z76.5: ICD-10-CM

## 2019-03-06 DIAGNOSIS — F41.9: ICD-10-CM

## 2019-03-06 DIAGNOSIS — R10.9: Primary | ICD-10-CM

## 2019-03-06 DIAGNOSIS — Z90.49: ICD-10-CM

## 2019-03-06 DIAGNOSIS — K52.9: ICD-10-CM

## 2019-03-06 DIAGNOSIS — Z98.84: ICD-10-CM

## 2019-03-06 LAB
ADD MANUAL DIFF: NO
ALBUMIN SERPL-MCNC: 4 G/DL (ref 3.4–5)
ALBUMIN/GLOB SERPL: 1.1 {RATIO} (ref 1–2.7)
ALP SERPL-CCNC: 95 U/L (ref 46–116)
ALT SERPL-CCNC: 29 U/L (ref 12–78)
ANION GAP SERPL CALC-SCNC: 15 MMOL/L (ref 5–15)
APPEARANCE UR: CLEAR
APTT PPP: (no result) S
AST SERPL-CCNC: 28 U/L (ref 15–37)
BASOPHILS NFR BLD AUTO: 1.4 % (ref 0–2)
BILIRUB SERPL-MCNC: 0.2 MG/DL (ref 0.2–1)
BUN SERPL-MCNC: 9 MG/DL (ref 7–18)
CALCIUM SERPL-MCNC: 9.1 MG/DL (ref 8.5–10.1)
CHLORIDE SERPL-SCNC: 104 MMOL/L (ref 98–107)
CO2 SERPL-SCNC: 20 MMOL/L (ref 21–32)
CREAT SERPL-MCNC: 0.7 MG/DL (ref 0.55–1.3)
EOSINOPHIL NFR BLD AUTO: 0.8 % (ref 0–3)
ERYTHROCYTE [DISTWIDTH] IN BLOOD BY AUTOMATED COUNT: 24.5 % (ref 11.6–14.8)
GLOBULIN SER-MCNC: 3.6 G/DL
GLUCOSE UR STRIP-MCNC: NEGATIVE MG/DL
HCT VFR BLD CALC: 32.9 % (ref 37–47)
HGB BLD-MCNC: 9.9 G/DL (ref 12–16)
KETONES UR QL STRIP: (no result)
LEUKOCYTE ESTERASE UR QL STRIP: NEGATIVE
LYMPHOCYTES NFR BLD AUTO: 9.3 % (ref 20–45)
MCV RBC AUTO: 68 FL (ref 80–99)
MONOCYTES NFR BLD AUTO: 5.6 % (ref 1–10)
NEUTROPHILS NFR BLD AUTO: 82.9 % (ref 45–75)
NITRITE UR QL STRIP: NEGATIVE
PH UR STRIP: 5 [PH] (ref 4.5–8)
PLATELET # BLD: 374 K/UL (ref 150–450)
POTASSIUM SERPL-SCNC: 3.9 MMOL/L (ref 3.5–5.1)
PROT UR QL STRIP: NEGATIVE
RBC # BLD AUTO: 4.84 M/UL (ref 4.2–5.4)
SODIUM SERPL-SCNC: 139 MMOL/L (ref 136–145)
SP GR UR STRIP: 1.01 (ref 1–1.03)
UROBILINOGEN UR-MCNC: NORMAL MG/DL (ref 0–1)
WBC # BLD AUTO: 6.2 K/UL (ref 4.8–10.8)

## 2019-03-06 PROCEDURE — 85025 COMPLETE CBC W/AUTO DIFF WBC: CPT

## 2019-03-06 PROCEDURE — 83690 ASSAY OF LIPASE: CPT

## 2019-03-06 PROCEDURE — 74176 CT ABD & PELVIS W/O CONTRAST: CPT

## 2019-03-06 PROCEDURE — 99284 EMERGENCY DEPT VISIT MOD MDM: CPT

## 2019-03-06 PROCEDURE — 80053 COMPREHEN METABOLIC PANEL: CPT

## 2019-03-06 PROCEDURE — 96361 HYDRATE IV INFUSION ADD-ON: CPT

## 2019-03-06 PROCEDURE — 81003 URINALYSIS AUTO W/O SCOPE: CPT

## 2019-03-06 PROCEDURE — 96376 TX/PRO/DX INJ SAME DRUG ADON: CPT

## 2019-03-06 PROCEDURE — 96375 TX/PRO/DX INJ NEW DRUG ADDON: CPT

## 2019-03-06 PROCEDURE — 36415 COLL VENOUS BLD VENIPUNCTURE: CPT

## 2019-03-06 PROCEDURE — 96374 THER/PROPH/DIAG INJ IV PUSH: CPT

## 2019-03-06 PROCEDURE — 81025 URINE PREGNANCY TEST: CPT

## 2019-05-29 ENCOUNTER — HOSPITAL ENCOUNTER (INPATIENT)
Dept: HOSPITAL 72 - EMR | Age: 39
LOS: 1 days | Discharge: HOME | DRG: 440 | End: 2019-05-30
Payer: MEDICARE

## 2019-05-29 VITALS — DIASTOLIC BLOOD PRESSURE: 66 MMHG | SYSTOLIC BLOOD PRESSURE: 101 MMHG

## 2019-05-29 VITALS — WEIGHT: 129.44 LBS | BODY MASS INDEX: 22.1 KG/M2 | HEIGHT: 64 IN

## 2019-05-29 VITALS — SYSTOLIC BLOOD PRESSURE: 115 MMHG | DIASTOLIC BLOOD PRESSURE: 73 MMHG

## 2019-05-29 VITALS — SYSTOLIC BLOOD PRESSURE: 102 MMHG | DIASTOLIC BLOOD PRESSURE: 65 MMHG

## 2019-05-29 VITALS — DIASTOLIC BLOOD PRESSURE: 73 MMHG | SYSTOLIC BLOOD PRESSURE: 110 MMHG

## 2019-05-29 VITALS — DIASTOLIC BLOOD PRESSURE: 65 MMHG | SYSTOLIC BLOOD PRESSURE: 101 MMHG

## 2019-05-29 VITALS — SYSTOLIC BLOOD PRESSURE: 111 MMHG | DIASTOLIC BLOOD PRESSURE: 62 MMHG

## 2019-05-29 VITALS — DIASTOLIC BLOOD PRESSURE: 67 MMHG | SYSTOLIC BLOOD PRESSURE: 96 MMHG

## 2019-05-29 DIAGNOSIS — K85.90: Primary | ICD-10-CM

## 2019-05-29 DIAGNOSIS — D50.9: ICD-10-CM

## 2019-05-29 DIAGNOSIS — K74.60: ICD-10-CM

## 2019-05-29 DIAGNOSIS — Z88.8: ICD-10-CM

## 2019-05-29 DIAGNOSIS — Z98.84: ICD-10-CM

## 2019-05-29 DIAGNOSIS — Z87.891: ICD-10-CM

## 2019-05-29 DIAGNOSIS — K44.9: ICD-10-CM

## 2019-05-29 DIAGNOSIS — F10.21: ICD-10-CM

## 2019-05-29 DIAGNOSIS — R16.1: ICD-10-CM

## 2019-05-29 DIAGNOSIS — Z90.49: ICD-10-CM

## 2019-05-29 LAB
% IRON SATURATION: 4 % (ref 15–50)
ADD MANUAL DIFF: NO
ALBUMIN SERPL-MCNC: 3.5 G/DL (ref 3.4–5)
ALBUMIN/GLOB SERPL: 1.1 {RATIO} (ref 1–2.7)
ALP SERPL-CCNC: 68 U/L (ref 46–116)
ALT SERPL-CCNC: 40 U/L (ref 12–78)
ANION GAP SERPL CALC-SCNC: 10 MMOL/L (ref 5–15)
APPEARANCE UR: CLEAR
APTT BLD: 23 SEC (ref 23–33)
APTT PPP: (no result) S
AST SERPL-CCNC: 51 U/L (ref 15–37)
BASOPHILS NFR BLD AUTO: 2.2 % (ref 0–2)
BILIRUB SERPL-MCNC: 0.2 MG/DL (ref 0.2–1)
BUN SERPL-MCNC: 8 MG/DL (ref 7–18)
CALCIUM SERPL-MCNC: 8.8 MG/DL (ref 8.5–10.1)
CHLORIDE SERPL-SCNC: 106 MMOL/L (ref 98–107)
CO2 SERPL-SCNC: 21 MMOL/L (ref 21–32)
CREAT SERPL-MCNC: 0.6 MG/DL (ref 0.55–1.3)
EOSINOPHIL NFR BLD AUTO: 3.1 % (ref 0–3)
ERYTHROCYTE [DISTWIDTH] IN BLOOD BY AUTOMATED COUNT: 21.2 % (ref 11.6–14.8)
FERRITIN SERPL-MCNC: 12 NG/ML (ref 8–388)
GLOBULIN SER-MCNC: 3.1 G/DL
GLUCOSE UR STRIP-MCNC: NEGATIVE MG/DL
HCT VFR BLD CALC: 31.7 % (ref 37–47)
HGB BLD-MCNC: 9.8 G/DL (ref 12–16)
INR PPP: 1 (ref 0.9–1.1)
IRON SERPL-MCNC: 16 UG/DL (ref 50–175)
KETONES UR QL STRIP: NEGATIVE
LDH SERPL L TO P-CCNC: 208 U/L (ref 81–234)
LEUKOCYTE ESTERASE UR QL STRIP: NEGATIVE
LYMPHOCYTES NFR BLD AUTO: 44.3 % (ref 20–45)
MCV RBC AUTO: 70 FL (ref 80–99)
MONOCYTES NFR BLD AUTO: 5.9 % (ref 1–10)
NEUTROPHILS NFR BLD AUTO: 44.5 % (ref 45–75)
NITRITE UR QL STRIP: NEGATIVE
PH UR STRIP: 5 [PH] (ref 4.5–8)
PLATELET # BLD: 183 K/UL (ref 150–450)
POTASSIUM SERPL-SCNC: 4.7 MMOL/L (ref 3.5–5.1)
PROT UR QL STRIP: NEGATIVE
RBC # BLD AUTO: 4.52 M/UL (ref 4.2–5.4)
SODIUM SERPL-SCNC: 137 MMOL/L (ref 136–145)
SP GR UR STRIP: 1.01 (ref 1–1.03)
TIBC SERPL-MCNC: 457 UG/DL (ref 250–450)
UNSATURATED IRON BINDING: 441 UG/DL (ref 112–346)
UROBILINOGEN UR-MCNC: NORMAL MG/DL (ref 0–1)
WBC # BLD AUTO: 3.5 K/UL (ref 4.8–10.8)

## 2019-05-29 PROCEDURE — 85007 BL SMEAR W/DIFF WBC COUNT: CPT

## 2019-05-29 PROCEDURE — 84443 ASSAY THYROID STIM HORMONE: CPT

## 2019-05-29 PROCEDURE — 85610 PROTHROMBIN TIME: CPT

## 2019-05-29 PROCEDURE — 85060 BLOOD SMEAR INTERPRETATION: CPT

## 2019-05-29 PROCEDURE — 80053 COMPREHEN METABOLIC PANEL: CPT

## 2019-05-29 PROCEDURE — 81025 URINE PREGNANCY TEST: CPT

## 2019-05-29 PROCEDURE — 85044 MANUAL RETICULOCYTE COUNT: CPT

## 2019-05-29 PROCEDURE — 86709 HEPATITIS A IGM ANTIBODY: CPT

## 2019-05-29 PROCEDURE — 76700 US EXAM ABDOM COMPLETE: CPT

## 2019-05-29 PROCEDURE — 87340 HEPATITIS B SURFACE AG IA: CPT

## 2019-05-29 PROCEDURE — 99285 EMERGENCY DEPT VISIT HI MDM: CPT

## 2019-05-29 PROCEDURE — 86703 HIV-1/HIV-2 1 RESULT ANTBDY: CPT

## 2019-05-29 PROCEDURE — 83690 ASSAY OF LIPASE: CPT

## 2019-05-29 PROCEDURE — 96361 HYDRATE IV INFUSION ADD-ON: CPT

## 2019-05-29 PROCEDURE — 82270 OCCULT BLOOD FECES: CPT

## 2019-05-29 PROCEDURE — 81003 URINALYSIS AUTO W/O SCOPE: CPT

## 2019-05-29 PROCEDURE — 86705 HEP B CORE ANTIBODY IGM: CPT

## 2019-05-29 PROCEDURE — 85730 THROMBOPLASTIN TIME PARTIAL: CPT

## 2019-05-29 PROCEDURE — 83540 ASSAY OF IRON: CPT

## 2019-05-29 PROCEDURE — 96372 THER/PROPH/DIAG INJ SC/IM: CPT

## 2019-05-29 PROCEDURE — 71045 X-RAY EXAM CHEST 1 VIEW: CPT

## 2019-05-29 PROCEDURE — 96374 THER/PROPH/DIAG INJ IV PUSH: CPT

## 2019-05-29 PROCEDURE — 83550 IRON BINDING TEST: CPT

## 2019-05-29 PROCEDURE — 74018 RADEX ABDOMEN 1 VIEW: CPT

## 2019-05-29 PROCEDURE — 86803 HEPATITIS C AB TEST: CPT

## 2019-05-29 PROCEDURE — 80307 DRUG TEST PRSMV CHEM ANLYZR: CPT

## 2019-05-29 PROCEDURE — 85025 COMPLETE CBC W/AUTO DIFF WBC: CPT

## 2019-05-29 PROCEDURE — 83615 LACTATE (LD) (LDH) ENZYME: CPT

## 2019-05-29 PROCEDURE — 82728 ASSAY OF FERRITIN: CPT

## 2019-05-29 PROCEDURE — 36415 COLL VENOUS BLD VENIPUNCTURE: CPT

## 2019-05-29 RX ADMIN — Medication PRN MLS/HR: at 19:55

## 2019-05-29 RX ADMIN — HYDROCODONE BITARTRATE AND ACETAMINOPHEN PRN TAB: 5; 325 TABLET ORAL at 22:07

## 2019-05-29 RX ADMIN — ZOLPIDEM TARTRATE PRN MG: 5 TABLET ORAL at 21:33

## 2019-05-29 RX ADMIN — ZOLPIDEM TARTRATE PRN MG: 5 TABLET ORAL at 22:45

## 2019-05-29 RX ADMIN — DIPHENHYDRAMINE HYDROCHLORIDE PRN MG: 50 INJECTION INTRAMUSCULAR; INTRAVENOUS at 09:39

## 2019-05-29 RX ADMIN — Medication PRN MLS/HR: at 23:58

## 2019-05-29 RX ADMIN — HYDROCODONE BITARTRATE AND ACETAMINOPHEN PRN TAB: 5; 325 TABLET ORAL at 12:13

## 2019-05-29 RX ADMIN — HYDROCODONE BITARTRATE AND ACETAMINOPHEN PRN TAB: 5; 325 TABLET ORAL at 17:06

## 2019-05-29 RX ADMIN — DIPHENHYDRAMINE HYDROCHLORIDE PRN MG: 50 INJECTION INTRAMUSCULAR; INTRAVENOUS at 15:34

## 2019-05-30 VITALS — SYSTOLIC BLOOD PRESSURE: 105 MMHG | DIASTOLIC BLOOD PRESSURE: 71 MMHG

## 2019-05-30 VITALS — SYSTOLIC BLOOD PRESSURE: 90 MMHG | DIASTOLIC BLOOD PRESSURE: 50 MMHG

## 2019-05-30 VITALS — SYSTOLIC BLOOD PRESSURE: 102 MMHG | DIASTOLIC BLOOD PRESSURE: 73 MMHG

## 2019-05-30 LAB
ADD MANUAL DIFF: NO
ALBUMIN SERPL-MCNC: 3.1 G/DL (ref 3.4–5)
ALBUMIN/GLOB SERPL: 1.1 {RATIO} (ref 1–2.7)
ALP SERPL-CCNC: 58 U/L (ref 46–116)
ALT SERPL-CCNC: 34 U/L (ref 12–78)
ANION GAP SERPL CALC-SCNC: 9 MMOL/L (ref 5–15)
AST SERPL-CCNC: 31 U/L (ref 15–37)
BASOPHILS NFR BLD AUTO: 1.6 % (ref 0–2)
BILIRUB SERPL-MCNC: < 0.1 MG/DL (ref 0.2–1)
BUN SERPL-MCNC: 13 MG/DL (ref 7–18)
CALCIUM SERPL-MCNC: 8.3 MG/DL (ref 8.5–10.1)
CHLORIDE SERPL-SCNC: 110 MMOL/L (ref 98–107)
CO2 SERPL-SCNC: 25 MMOL/L (ref 21–32)
CREAT SERPL-MCNC: 0.8 MG/DL (ref 0.55–1.3)
EOSINOPHIL NFR BLD AUTO: 2.2 % (ref 0–3)
ERYTHROCYTE [DISTWIDTH] IN BLOOD BY AUTOMATED COUNT: 20.8 % (ref 11.6–14.8)
GLOBULIN SER-MCNC: 2.7 G/DL
HCT VFR BLD CALC: 29.3 % (ref 37–47)
HGB BLD-MCNC: 8.8 G/DL (ref 12–16)
LYMPHOCYTES NFR BLD AUTO: 34.3 % (ref 20–45)
MCV RBC AUTO: 71 FL (ref 80–99)
MONOCYTES NFR BLD AUTO: 6.6 % (ref 1–10)
NEUTROPHILS NFR BLD AUTO: 55.3 % (ref 45–75)
PLATELET # BLD: 284 K/UL (ref 150–450)
POTASSIUM SERPL-SCNC: 3.7 MMOL/L (ref 3.5–5.1)
RBC # BLD AUTO: 4.15 M/UL (ref 4.2–5.4)
SODIUM SERPL-SCNC: 144 MMOL/L (ref 136–145)
WBC # BLD AUTO: 4 K/UL (ref 4.8–10.8)

## 2019-05-30 RX ADMIN — HYDROCODONE BITARTRATE AND ACETAMINOPHEN PRN TAB: 5; 325 TABLET ORAL at 05:59

## 2019-05-30 RX ADMIN — HYDROCODONE BITARTRATE AND ACETAMINOPHEN PRN TAB: 5; 325 TABLET ORAL at 10:26

## 2019-05-30 RX ADMIN — Medication PRN MLS/HR: at 03:59

## 2019-05-30 RX ADMIN — HYDROCODONE BITARTRATE AND ACETAMINOPHEN PRN TAB: 5; 325 TABLET ORAL at 01:58

## 2019-05-30 RX ADMIN — Medication PRN MLS/HR: at 08:42

## 2019-06-27 ENCOUNTER — HOSPITAL ENCOUNTER (EMERGENCY)
Dept: HOSPITAL 91 - FTE | Age: 39
Discharge: HOME | End: 2019-06-27
Payer: SELF-PAY

## 2019-06-27 ENCOUNTER — HOSPITAL ENCOUNTER (EMERGENCY)
Dept: HOSPITAL 10 - FTE | Age: 39
Discharge: HOME | End: 2019-06-27
Payer: MEDICAID

## 2019-06-27 VITALS — RESPIRATION RATE: 20 BRPM | DIASTOLIC BLOOD PRESSURE: 78 MMHG | HEART RATE: 64 BPM | SYSTOLIC BLOOD PRESSURE: 116 MMHG

## 2019-06-27 VITALS
WEIGHT: 123.46 LBS | HEIGHT: 64 IN | WEIGHT: 123.46 LBS | HEIGHT: 64 IN | BODY MASS INDEX: 21.08 KG/M2 | BODY MASS INDEX: 21.08 KG/M2

## 2019-06-27 DIAGNOSIS — F41.9: Primary | ICD-10-CM

## 2019-06-27 DIAGNOSIS — Z72.89: ICD-10-CM

## 2019-06-27 DIAGNOSIS — F17.210: ICD-10-CM

## 2019-06-27 PROCEDURE — 99282 EMERGENCY DEPT VISIT SF MDM: CPT

## 2019-07-21 ENCOUNTER — HOSPITAL ENCOUNTER (EMERGENCY)
Dept: HOSPITAL 54 - ER | Age: 39
Discharge: HOME | End: 2019-07-21
Payer: COMMERCIAL

## 2019-07-21 VITALS — DIASTOLIC BLOOD PRESSURE: 76 MMHG | SYSTOLIC BLOOD PRESSURE: 118 MMHG

## 2019-07-21 VITALS — HEIGHT: 66 IN | BODY MASS INDEX: 21.05 KG/M2 | WEIGHT: 131 LBS

## 2019-07-21 DIAGNOSIS — E87.1: ICD-10-CM

## 2019-07-21 DIAGNOSIS — R19.7: ICD-10-CM

## 2019-07-21 DIAGNOSIS — Z90.49: ICD-10-CM

## 2019-07-21 DIAGNOSIS — Z88.6: ICD-10-CM

## 2019-07-21 DIAGNOSIS — F41.9: Primary | ICD-10-CM

## 2019-07-21 DIAGNOSIS — F17.200: ICD-10-CM

## 2019-07-21 DIAGNOSIS — R10.13: ICD-10-CM

## 2019-07-21 DIAGNOSIS — R11.2: ICD-10-CM

## 2019-07-21 DIAGNOSIS — Z98.890: ICD-10-CM

## 2019-07-21 DIAGNOSIS — Z79.899: ICD-10-CM

## 2019-07-21 DIAGNOSIS — G89.29: ICD-10-CM

## 2019-07-21 DIAGNOSIS — D50.9: ICD-10-CM

## 2019-07-21 DIAGNOSIS — F32.9: ICD-10-CM

## 2019-07-21 DIAGNOSIS — E88.09: ICD-10-CM

## 2019-07-21 LAB
ALBUMIN SERPL BCP-MCNC: 3 G/DL (ref 3.4–5)
ALP SERPL-CCNC: 79 U/L (ref 46–116)
ALT SERPL W P-5'-P-CCNC: 41 U/L (ref 12–78)
AST SERPL W P-5'-P-CCNC: 27 U/L (ref 15–37)
BASOPHILS # BLD AUTO: 0.1 /CMM (ref 0–0.2)
BASOPHILS NFR BLD AUTO: 2.7 % (ref 0–2)
BILIRUB DIRECT SERPL-MCNC: 0.1 MG/DL (ref 0–0.2)
BILIRUB SERPL-MCNC: 0.3 MG/DL (ref 0.2–1)
BUN SERPL-MCNC: 7 MG/DL (ref 7–18)
CALCIUM SERPL-MCNC: 8.2 MG/DL (ref 8.5–10.1)
CHLORIDE SERPL-SCNC: 99 MMOL/L (ref 98–107)
CO2 SERPL-SCNC: 21 MMOL/L (ref 21–32)
CREAT SERPL-MCNC: 0.6 MG/DL (ref 0.6–1.3)
EOSINOPHIL NFR BLD AUTO: 0 % (ref 0–6)
GLUCOSE SERPL-MCNC: 75 MG/DL (ref 74–106)
HCT VFR BLD AUTO: 32 % (ref 33–45)
HGB BLD-MCNC: 10.1 G/DL (ref 11.5–14.8)
LIPASE SERPL-CCNC: 169 U/L (ref 73–393)
LYMPHOCYTES NFR BLD AUTO: 0.5 /CMM (ref 0.8–4.8)
LYMPHOCYTES NFR BLD AUTO: 12.5 % (ref 20–44)
MCHC RBC AUTO-ENTMCNC: 31 G/DL (ref 31–36)
MCV RBC AUTO: 76 FL (ref 82–100)
MONOCYTES NFR BLD AUTO: 0.2 /CMM (ref 0.1–1.3)
MONOCYTES NFR BLD AUTO: 5.6 % (ref 2–12)
NEUTROPHILS # BLD AUTO: 3.1 /CMM (ref 1.8–8.9)
NEUTROPHILS NFR BLD AUTO: 79.2 % (ref 43–81)
PLATELET # BLD AUTO: 266 /CMM (ref 150–450)
POTASSIUM SERPL-SCNC: 3.5 MMOL/L (ref 3.5–5.1)
PROT SERPL-MCNC: 5.9 G/DL (ref 6.4–8.2)
RBC # BLD AUTO: 4.21 MIL/UL (ref 4–5.2)
SODIUM SERPL-SCNC: 134 MMOL/L (ref 136–145)
WBC NRBC COR # BLD AUTO: 3.9 K/UL (ref 4.3–11)

## 2019-07-21 PROCEDURE — 99284 EMERGENCY DEPT VISIT MOD MDM: CPT

## 2019-07-21 PROCEDURE — 80076 HEPATIC FUNCTION PANEL: CPT

## 2019-07-21 PROCEDURE — 36415 COLL VENOUS BLD VENIPUNCTURE: CPT

## 2019-07-21 PROCEDURE — 96374 THER/PROPH/DIAG INJ IV PUSH: CPT

## 2019-07-21 PROCEDURE — 96375 TX/PRO/DX INJ NEW DRUG ADDON: CPT

## 2019-07-21 PROCEDURE — 96361 HYDRATE IV INFUSION ADD-ON: CPT

## 2019-07-21 PROCEDURE — 83690 ASSAY OF LIPASE: CPT

## 2019-07-21 PROCEDURE — 80048 BASIC METABOLIC PNL TOTAL CA: CPT

## 2019-07-21 PROCEDURE — 96376 TX/PRO/DX INJ SAME DRUG ADON: CPT

## 2019-07-21 PROCEDURE — 85025 COMPLETE CBC W/AUTO DIFF WBC: CPT

## 2019-07-21 PROCEDURE — 96372 THER/PROPH/DIAG INJ SC/IM: CPT

## 2019-07-21 NOTE — ERD
ER Documentation


Chief Complaint


Chief Complaint





Pt. here for HIV test





HPI


39-year-old female presents for HIV testing.  Patient states that her boyfriend 


told her she needed to get tested otherwise she is going to leave her.  Patient 


states that she was just at [facility] and they told her that they do


not do that there and told her to come to this hospital.  In addition, patient 


states that she has a history of chronic pancreatitis and she was tested at 


[place] and results were negative.  She also states she has some anxiety.  


She says she would like some pain medication for her abdominal pain.  States the


pain is mostly upper right quadrant.  States that she has chronic abdominal pain


and just had a CT scan a few days ago which was negative.  States she does not 


want another CT scan..  History of cholecystectomy gastric bypass.  Denies any 


vomiting, nausea, diarrhea, fevers, chills, right lower quadrant pain, dysuria, 


hematuria.





ROS


All systems reviewed and are negative except as per history of present illness.





Medications


Home Meds


Active Scripts


Lorazepam* (Lorazepam*) 1 Mg Tablet, 1 MG PO QDAY for 3 Days, #3 TAB


   Prov:SARAH VILLALTA MD         12/10/18


Lorazepam* (Ativan*) 2 Mg Tablet, 2 MG PO BID PRN for ANXIETY, #7 TAB


   Prov:LAMBERTO LOMAX         10/29/18


Lorazepam* (Ativan*) 2 Mg Tablet, 2 MG PO HS PRN for ANXIETY, #5 TAB


   Prov:ALLISON KANG MD         10/21/18


Lorazepam* (Ativan*) 2 Mg Tablet, 2 MG PO Q8 PRN for ANXIETY, #7 TAB


   Prov:CAROLYN SCHWAB PA-C         10/7/18


Gabapentin* (Neurontin*) 300 Mg Capsule, 300 MG PO BID, #40 CAP


   Prov:JOETRACY         9/11/18


Hydroxyzine Pamoate* (Vistaril*) 25 Mg Capsule, 25 MG PO Q8 PRN for ANXIETY, #10


CAP


   Prov:PEDRO SIMPSON DO         9/2/18


Ranitidine Hcl* (Zantac*) 150 Mg Tablet, 150 MG PO BID PRN for EPIGASTRIC PAIN, 


#30 TAB


   Prov:CAROLYN VALENTE         7/25/18


Ondansetron (Ondansetron Odt) 4 Mg Tab.rapdis, 4 MG PO Q6H PRN for NAUSEA AND/OR


VOMITING, #10 TAB


   Prov:CAROLYN VALENTE         7/25/18


Reported Medications


Divalproex Sodium* (Divalproex Sodium*) 500 Mg Tablet.dr, 500 MG PO QHS, #120 


TAB


   7/25/18


Lithium Carbonate* (Lithium Carbonate*) 150 Mg Capsule, 150 MG PO QID, CAP


   7/25/18


Lithium Carbonate* (Lithium*) 150 Mg Cap, 150 MG PO BID, CAP


   7/25/18





Allergies


Allergies:  


Coded Allergies:  


     No Known Allergies (Verified  Allergy, Mild, 7/21/19)





PMhx/Soc


History of Surgery:  Yes (GASTRIC BYPASS,CHOLECYSTECTOMY,BREAST REDUCTION,TUMMY 


TUCK)


Anesthesia Reaction:  No


Hx Neurological Disorder:  No


Hx Respiratory Disorders:  No


Hx Cardiac Disorders:  Yes (CARDIOMYOPATHY)


Hx Psychiatric Problems:  No


Hx Miscellaneous Medical Probl:  Yes (anxiety)


Hx Alcohol Use:  No


Hx Substance Use:  No


Hx Tobacco Use:  Yes


Smoking Status:  Current every day smoker





FmHx


Family History:  No diabetes, No coronary disease, No other





Physical Exam


Vitals





Vital Signs


  Date      Temp  Pulse  Resp  B/P (MAP)   Pulse Ox  O2          O2 Flow    FiO2


Time                                                 Delivery    Rate


   7/21/19  98.9     77    20      113/73        97


     17:28                           (86)





Physical Exam


Const:   No acute distress


Head:   Atraumatic 


Eyes:    Normal Conjunctiva


ENT:    Normal External Ears, Nose and Mouth.


Neck:               Full range of motion. No meningismus.


Resp:   Clear to auscultation bilaterally


Cardio:   Regular rate and rhythm, no murmurs


Abd:    Soft, non tender, non distended. Normal bowel sounds.  Negative 


McBurney's.  Negative Reynoso's.


Skin:   No petechiae or rashes


Back:   No midline or flank tenderness


Ext:    No cyanosis, or edema


Neur:   Awake and alert


Psych:    Normal Mood and Affect


Result Diagram:  


7/21/19 182





Results 24 hrs





Laboratory Tests


              Test
                                 7/21/19
18:23


              White Blood Count                      4.7 10^3/ul


              Red Blood Count                       4.54 10^6/ul


              Hemoglobin                               10.7 g/dl


              Hematocrit                                  35.5 %


              Mean Corpuscular Volume                    78.2 fl


              Mean Corpuscular Hemoglobin                23.6 pg


              Mean Corpuscular Hemoglobin
Concent     30.1 g/dl 



              Red Cell Distribution Width                 23.5 %


              Platelet Count                         307 10^3/UL


              Mean Platelet Volume                       10.1 fl


              Immature Granulocytes %                    0.200 %


              Neutrophils %                               71.8 %


              Lymphocytes %                               20.3 %


              Monocytes %                                  6.9 %


              Eosinophils %                                0.2 %


              Basophils %                                  0.6 %


              Nucleated Red Blood Cells %            0.0 /100WBC


              Immature Granulocytes #              0.010 10^3/ul


              Neutrophils #                          3.4 10^3/ul


              Lymphocytes #                          1.0 10^3/ul


              Monocytes #                            0.3 10^3/ul


              Eosinophils #                          0.0 10^3/ul


              Basophils #                            0.0 10^3/ul


              Nucleated Red Blood Cells #            0.0 10^3/ul





Current Medications


 Medications
   Dose
          Sig/Ethan
       Start Time
   Status  Last


 (Trade)       Ordered        Route
 PRN     Stop Time              Admin
Dose


                              Reason                                Admin


                1 tab          ONCE  ONCE
    7/21/19       DC           7/21/19


Acetaminophen                 PO
            18:00
                       18:05



/
                                           7/21/19 18:01


Hydrocodone


Bitart



(Norco


(10/325))








Procedures/MDM


MDM: Patient stated she was having some abdominal pain but her abdominal exam 


was benign and she refused CT scan.  I have low suspicion for acute coronary 


syndrome, AAA, mesenteric ischemia, lower lobe pneumonia, DKA, bowel 


perforation, cholecystitis, choledocholithiasis, ascending cholangitis, hepatic 


abscess, pancreatitis, PUD, splenic rupture, diverticulitis, pyelonephritis, 


nephrolithiasis, appendicitis, pregnancy, ectopic pregnancy, PID, ovarian 


torsion or tubo-ovarian abscess.





Patient signed out to [facility] pending results of lab work.





Departure


Diagnosis:  


   Primary Impression:  


   Anxiety


   Additional Impressions:  


   Screening for STD (sexually transmitted disease)


   Abdominal pain


Condition:  CAROLYN Campoverde               Jul 21, 2019 18:09

## 2019-07-21 NOTE — NUR
JORGE A, HOMELESS, C/O N/V/D, ANXIETY.  "I DON'T KNOW HOW I'M GOING TO TAKE 
CARE OF MYSELF".  ALSO C/O "SEVERE" ABDOMINAL PAIN, 6/10 THAT IS SHARP/STABBING 
IN NATURE.  DENIES SOB, DIZZINESS, WEAKNESS.  DENIES IV DRUG USE.  SKIN INTACT, 
AOX4, AMBULATORY.  STABLE VITAL SIGNS.  READY FOR EVAL.

## 2020-02-07 ENCOUNTER — HOSPITAL ENCOUNTER (EMERGENCY)
Dept: HOSPITAL 72 - EMR | Age: 40
Discharge: LEFT BEFORE DISCHARGE | End: 2020-02-07
Payer: MEDICARE

## 2020-02-07 VITALS — DIASTOLIC BLOOD PRESSURE: 63 MMHG | SYSTOLIC BLOOD PRESSURE: 98 MMHG

## 2020-02-07 VITALS — BODY MASS INDEX: 21.69 KG/M2 | HEIGHT: 66 IN | WEIGHT: 135 LBS

## 2020-02-07 DIAGNOSIS — R10.9: Primary | ICD-10-CM

## 2020-02-07 DIAGNOSIS — Z88.8: ICD-10-CM

## 2020-02-07 DIAGNOSIS — F10.10: ICD-10-CM
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
## 2020-02-07 DIAGNOSIS — Z90.49: ICD-10-CM

## 2020-02-07 DIAGNOSIS — I42.9: ICD-10-CM

## 2020-02-07 DIAGNOSIS — Z53.29: ICD-10-CM

## 2020-02-07 DIAGNOSIS — Z98.84: ICD-10-CM

## 2020-02-07 DIAGNOSIS — F11.20: ICD-10-CM

## 2020-02-07 PROCEDURE — 96374 THER/PROPH/DIAG INJ IV PUSH: CPT

## 2020-02-07 PROCEDURE — 96375 TX/PRO/DX INJ NEW DRUG ADDON: CPT

## 2020-02-07 PROCEDURE — 99284 EMERGENCY DEPT VISIT MOD MDM: CPT

## 2020-02-07 PROCEDURE — 96361 HYDRATE IV INFUSION ADD-ON: CPT

## 2020-02-08 ENCOUNTER — HOSPITAL ENCOUNTER (EMERGENCY)
Dept: HOSPITAL 72 - EMR | Age: 40
LOS: 1 days | Discharge: HOME | End: 2020-02-09
Payer: MEDICARE

## 2020-02-08 VITALS — SYSTOLIC BLOOD PRESSURE: 111 MMHG | DIASTOLIC BLOOD PRESSURE: 57 MMHG

## 2020-02-08 VITALS — HEIGHT: 65 IN | WEIGHT: 170 LBS | BODY MASS INDEX: 28.32 KG/M2

## 2020-02-08 VITALS — SYSTOLIC BLOOD PRESSURE: 109 MMHG | DIASTOLIC BLOOD PRESSURE: 51 MMHG

## 2020-02-08 DIAGNOSIS — Z76.5: ICD-10-CM

## 2020-02-08 DIAGNOSIS — R10.9: Primary | ICD-10-CM

## 2020-02-08 DIAGNOSIS — G89.4: ICD-10-CM

## 2020-02-08 DIAGNOSIS — F10.10: ICD-10-CM

## 2020-02-08 DIAGNOSIS — Z90.49: ICD-10-CM

## 2020-02-08 DIAGNOSIS — Z98.84: ICD-10-CM

## 2020-02-08 DIAGNOSIS — F11.20: ICD-10-CM

## 2020-02-08 DIAGNOSIS — Z88.8: ICD-10-CM

## 2020-02-08 LAB
ADD MANUAL DIFF: NO
ALBUMIN SERPL-MCNC: 3.1 G/DL (ref 3.4–5)
ALBUMIN/GLOB SERPL: 0.9 {RATIO} (ref 1–2.7)
ALP SERPL-CCNC: 90 U/L (ref 46–116)
ALT SERPL-CCNC: 70 U/L (ref 12–78)
ANION GAP SERPL CALC-SCNC: 11 MMOL/L (ref 5–15)
APPEARANCE UR: CLEAR
APTT BLD: 23 SEC (ref 23–33)
APTT PPP: (no result) S
AST SERPL-CCNC: 290 U/L (ref 15–37)
BASOPHILS NFR BLD AUTO: 1.2 % (ref 0–2)
BILIRUB SERPL-MCNC: 0.1 MG/DL (ref 0.2–1)
BUN SERPL-MCNC: 9 MG/DL (ref 7–18)
CALCIUM SERPL-MCNC: 8.4 MG/DL (ref 8.5–10.1)
CHLORIDE SERPL-SCNC: 109 MMOL/L (ref 98–107)
CO2 SERPL-SCNC: 24 MMOL/L (ref 21–32)
CREAT SERPL-MCNC: 0.5 MG/DL (ref 0.55–1.3)
EOSINOPHIL NFR BLD AUTO: 2.5 % (ref 0–3)
ERYTHROCYTE [DISTWIDTH] IN BLOOD BY AUTOMATED COUNT: 14.9 % (ref 11.6–14.8)
GLOBULIN SER-MCNC: 3.5 G/DL
GLUCOSE UR STRIP-MCNC: NEGATIVE MG/DL
HCT VFR BLD CALC: 33.2 % (ref 37–47)
HGB BLD-MCNC: 11.2 G/DL (ref 12–16)
INR PPP: 0.9 (ref 0.9–1.1)
KETONES UR QL STRIP: NEGATIVE
LEUKOCYTE ESTERASE UR QL STRIP: NEGATIVE
LYMPHOCYTES NFR BLD AUTO: 30.2 % (ref 20–45)
MCV RBC AUTO: 88 FL (ref 80–99)
MONOCYTES NFR BLD AUTO: 9.7 % (ref 1–10)
NEUTROPHILS NFR BLD AUTO: 56.4 % (ref 45–75)
NITRITE UR QL STRIP: NEGATIVE
PH UR STRIP: 5 [PH] (ref 4.5–8)
PLATELET # BLD: 205 K/UL (ref 150–450)
POTASSIUM SERPL-SCNC: 3.9 MMOL/L (ref 3.5–5.1)
PROT UR QL STRIP: NEGATIVE
RBC # BLD AUTO: 3.76 M/UL (ref 4.2–5.4)
SODIUM SERPL-SCNC: 144 MMOL/L (ref 136–145)
SP GR UR STRIP: 1.01 (ref 1–1.03)
UROBILINOGEN UR-MCNC: NORMAL MG/DL (ref 0–1)
WBC # BLD AUTO: 4.7 K/UL (ref 4.8–10.8)

## 2020-02-08 PROCEDURE — 80307 DRUG TEST PRSMV CHEM ANLYZR: CPT

## 2020-02-08 PROCEDURE — 83690 ASSAY OF LIPASE: CPT

## 2020-02-08 PROCEDURE — 81025 URINE PREGNANCY TEST: CPT

## 2020-02-08 PROCEDURE — 96361 HYDRATE IV INFUSION ADD-ON: CPT

## 2020-02-08 PROCEDURE — 99284 EMERGENCY DEPT VISIT MOD MDM: CPT

## 2020-02-08 PROCEDURE — 96374 THER/PROPH/DIAG INJ IV PUSH: CPT

## 2020-02-08 PROCEDURE — 36415 COLL VENOUS BLD VENIPUNCTURE: CPT

## 2020-02-08 PROCEDURE — 85730 THROMBOPLASTIN TIME PARTIAL: CPT

## 2020-02-08 PROCEDURE — 85025 COMPLETE CBC W/AUTO DIFF WBC: CPT

## 2020-02-08 PROCEDURE — 81003 URINALYSIS AUTO W/O SCOPE: CPT

## 2020-02-08 PROCEDURE — 80053 COMPREHEN METABOLIC PANEL: CPT

## 2020-02-08 PROCEDURE — 85610 PROTHROMBIN TIME: CPT

## 2020-02-09 VITALS — DIASTOLIC BLOOD PRESSURE: 64 MMHG | SYSTOLIC BLOOD PRESSURE: 116 MMHG

## 2020-04-26 ENCOUNTER — HOSPITAL ENCOUNTER (EMERGENCY)
Dept: HOSPITAL 54 - ER | Age: 40
Discharge: HOME | End: 2020-04-26
Payer: COMMERCIAL

## 2020-04-26 VITALS — DIASTOLIC BLOOD PRESSURE: 75 MMHG | SYSTOLIC BLOOD PRESSURE: 139 MMHG

## 2020-04-26 VITALS — WEIGHT: 110 LBS | BODY MASS INDEX: 18.78 KG/M2 | HEIGHT: 64 IN

## 2020-04-26 DIAGNOSIS — Z88.6: ICD-10-CM

## 2020-04-26 DIAGNOSIS — Z98.890: ICD-10-CM

## 2020-04-26 DIAGNOSIS — Z90.49: ICD-10-CM

## 2020-04-26 DIAGNOSIS — F41.9: ICD-10-CM
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
## 2020-04-26 DIAGNOSIS — F32.9: ICD-10-CM

## 2020-04-26 DIAGNOSIS — Z98.84: ICD-10-CM

## 2020-04-26 DIAGNOSIS — N39.0: Primary | ICD-10-CM

## 2020-04-26 DIAGNOSIS — Z79.899: ICD-10-CM

## 2020-04-26 DIAGNOSIS — G89.29: ICD-10-CM

## 2020-04-26 DIAGNOSIS — F10.239: ICD-10-CM

## 2020-04-26 LAB
PH UR STRIP: 5.5 [PH] (ref 5–8)
PROT UR QL STRIP: 30 MG/DL
UROBILINOGEN UR STRIP-MCNC: 0.2 EU/DL
WBC #/AREA URNS HPF: (no result) /HPF (ref 0–3)

## 2020-05-06 ENCOUNTER — HOSPITAL ENCOUNTER (EMERGENCY)
Dept: HOSPITAL 54 - ER | Age: 40
Discharge: HOME | End: 2020-05-06
Payer: COMMERCIAL

## 2020-05-06 VITALS — HEIGHT: 64 IN | BODY MASS INDEX: 24.75 KG/M2 | WEIGHT: 145 LBS

## 2020-05-06 VITALS — SYSTOLIC BLOOD PRESSURE: 130 MMHG | DIASTOLIC BLOOD PRESSURE: 91 MMHG

## 2020-05-06 DIAGNOSIS — F10.129: Primary | ICD-10-CM

## 2020-05-06 DIAGNOSIS — R00.0: ICD-10-CM

## 2020-05-06 DIAGNOSIS — R11.2: ICD-10-CM

## 2020-05-06 DIAGNOSIS — Z79.899: ICD-10-CM

## 2020-05-06 DIAGNOSIS — F32.9: ICD-10-CM

## 2020-05-06 DIAGNOSIS — Z98.890: ICD-10-CM

## 2020-05-06 DIAGNOSIS — Z41.1: ICD-10-CM
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
## 2020-05-06 DIAGNOSIS — Z76.5: ICD-10-CM

## 2020-05-06 DIAGNOSIS — Z90.49: ICD-10-CM

## 2020-05-06 DIAGNOSIS — R19.7: ICD-10-CM

## 2020-05-06 DIAGNOSIS — F41.9: ICD-10-CM

## 2020-05-06 DIAGNOSIS — Z88.6: ICD-10-CM

## 2020-05-06 DIAGNOSIS — G89.29: ICD-10-CM

## 2020-07-02 ENCOUNTER — HOSPITAL ENCOUNTER (EMERGENCY)
Dept: HOSPITAL 87 - ER | Age: 40
Discharge: HOME | End: 2020-07-02
Payer: COMMERCIAL

## 2020-07-02 VITALS — WEIGHT: 130.07 LBS | HEIGHT: 67 IN | BODY MASS INDEX: 20.42 KG/M2

## 2020-07-02 VITALS — DIASTOLIC BLOOD PRESSURE: 56 MMHG | SYSTOLIC BLOOD PRESSURE: 101 MMHG

## 2020-07-02 DIAGNOSIS — R10.9: ICD-10-CM

## 2020-07-02 DIAGNOSIS — F41.9: Primary | ICD-10-CM

## 2020-07-02 PROCEDURE — 99283 EMERGENCY DEPT VISIT LOW MDM: CPT

## 2020-07-02 PROCEDURE — 81025 URINE PREGNANCY TEST: CPT

## 2020-07-02 PROCEDURE — 93005 ELECTROCARDIOGRAM TRACING: CPT
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
## 2021-04-12 ENCOUNTER — HOSPITAL ENCOUNTER (EMERGENCY)
Dept: HOSPITAL 12 - ER | Age: 41
Discharge: HOME | End: 2021-04-12
Payer: COMMERCIAL

## 2021-04-12 VITALS — WEIGHT: 135 LBS | HEIGHT: 64 IN | BODY MASS INDEX: 23.05 KG/M2

## 2021-04-12 VITALS — SYSTOLIC BLOOD PRESSURE: 114 MMHG | DIASTOLIC BLOOD PRESSURE: 79 MMHG

## 2021-04-12 DIAGNOSIS — Z98.84: ICD-10-CM

## 2021-04-12 DIAGNOSIS — F41.0: ICD-10-CM

## 2021-04-12 DIAGNOSIS — F10.239: Primary | ICD-10-CM

## 2021-04-12 DIAGNOSIS — I42.9: ICD-10-CM

## 2021-04-12 DIAGNOSIS — Z79.899: ICD-10-CM

## 2021-04-12 DIAGNOSIS — N39.0: ICD-10-CM

## 2021-04-12 DIAGNOSIS — F32.9: ICD-10-CM

## 2021-04-12 LAB
APPEARANCE UR: (no result)
BILIRUB UR QL STRIP: NEGATIVE
COLOR UR: YELLOW
DEPRECATED SQUAMOUS URNS QL MICRO: (no result) /HPF
GLUCOSE UR STRIP-MCNC: NEGATIVE MG/DL
HCG UR QL: NEGATIVE
HGB UR QL STRIP: NEGATIVE
KETONES UR STRIP-MCNC: NEGATIVE MG/DL
LEUKOCYTE ESTERASE UR QL STRIP: (no result)
NITRITE UR QL STRIP: POSITIVE
PH UR STRIP: 6.5 [PH] (ref 5–8)
RBC #/AREA URNS HPF: (no result) /HPF (ref 0–3)
SP GR UR STRIP: >=1.03 (ref 1–1.03)
UROBILINOGEN UR STRIP-MCNC: 0.2 E.U./DL
WBC #/AREA URNS HPF: (no result) /HPF
WBC #/AREA URNS HPF: (no result) /HPF (ref 0–3)

## 2021-04-12 PROCEDURE — A4663 DIALYSIS BLOOD PRESSURE CUFF: HCPCS

## 2021-04-12 NOTE — NUR
Patient discharged to home in stable condition.  Written and verbal after care 
instructions given. 

Patient verbalizes understanding of instructions. Stressed follow up or return 
to ER for worsening s/s. Pt. walked with steady gait. Pt. instructed not to 
drive. Pt. states she feels better.